# Patient Record
Sex: MALE | Race: WHITE | NOT HISPANIC OR LATINO | Employment: FULL TIME | URBAN - METROPOLITAN AREA
[De-identification: names, ages, dates, MRNs, and addresses within clinical notes are randomized per-mention and may not be internally consistent; named-entity substitution may affect disease eponyms.]

---

## 2017-02-13 ENCOUNTER — OFFICE VISIT (OUTPATIENT)
Dept: FAMILY MEDICINE | Facility: CLINIC | Age: 57
End: 2017-02-13
Payer: COMMERCIAL

## 2017-02-13 VITALS
TEMPERATURE: 97.4 F | DIASTOLIC BLOOD PRESSURE: 76 MMHG | HEIGHT: 70 IN | WEIGHT: 220 LBS | BODY MASS INDEX: 31.5 KG/M2 | HEART RATE: 64 BPM | SYSTOLIC BLOOD PRESSURE: 121 MMHG

## 2017-02-13 DIAGNOSIS — Z00.01 ENCOUNTER FOR ROUTINE ADULT MEDICAL EXAM WITH ABNORMAL FINDINGS: ICD-10-CM

## 2017-02-13 DIAGNOSIS — Z00.00 ROUTINE GENERAL MEDICAL EXAMINATION AT A HEALTH CARE FACILITY: ICD-10-CM

## 2017-02-13 DIAGNOSIS — J30.1 SEASONAL ALLERGIC RHINITIS DUE TO POLLEN: Primary | ICD-10-CM

## 2017-02-13 PROCEDURE — 99396 PREV VISIT EST AGE 40-64: CPT | Performed by: FAMILY MEDICINE

## 2017-02-13 RX ORDER — FLUTICASONE PROPIONATE 50 MCG
1-2 SPRAY, SUSPENSION (ML) NASAL DAILY
Qty: 1 BOTTLE | Refills: 11 | Status: SHIPPED | OUTPATIENT
Start: 2017-02-13

## 2017-02-13 NOTE — MR AVS SNAPSHOT
After Visit Summary   2/13/2017    Cristopher Cuellar    MRN: [id number]           Patient Information     Date Of Birth          1960        Visit Information        Provider Department      2/13/2017 6:00 PM Zafar Fragoso MD [facility]        Today's Diagnoses     Routine general medical examination at a health care facility        Encounter for routine adult medical exam with abnormal findings          Care Instructions    Heal issue is plantar fasciatus.  Stretch you calfs hamstrings, back.  If not improved call me for night splint.    Use Flonase daily for allergies.     Sit up straight.    Relaxation technique 1: Breathing meditation for stress relief  With its focus on full, cleansing breaths, deep breathing is a simple, yet powerful, relaxation technique. It s easy to learn, can be practiced almost anywhere, and provides a quick way to get your stress levels in check. Deep breathing is the cornerstone of many other relaxation practices, too, and can be combined with other relaxing elements such as aromatherapy and music. All you really need is a few minutes and a place to stretch out.  Practicing deep breathing meditation  The key to deep breathing is to breathe deeply from the abdomen, getting as much fresh air as possible in your lungs. When you take deep breaths from the abdomen, rather than shallow breaths from your upper chest, you inhale more oxygen. The more oxygen you get, the less tense, short of breath, and anxious you feel.  Sit comfortably with your back straight. Put one hand on your chest and the other on your stomach.   Breathe in through your nose. The hand on your stomach should rise. The hand on your chest should move very little.   Exhale through your mouth, pushing out as much air as you can while duke your abdominal muscles. The hand on your stomach should move in as you exhale, but your other hand should move very little.   Continue to breathe in  through your nose and out through your mouth. Try to inhale enough so that your lower abdomen rises and falls. Count slowly as you exhale.   If you find it difficult breathing from your abdomen while sitting up, try lying on the floor. Put a small book on your stomach, and try to breathe so that the book rises as you inhale and falls as you exhale.   Relaxation technique 2: Progressive muscle relaxation for stress relief  Progressive muscle relaxation involves a two-step process in which you systematically tense and relax different muscle groups in the body.  With regular practice, progressive muscle relaxation gives you an intimate familiarity with what tension--as well as complete relaxation--feels like in different parts of the body. This awareness helps you spot and counteract the first signs of the muscular tension that accompanies stress. And as your body relaxes, so will your mind. You can combine deep breathing with progressive muscle relaxation for an additional level of stress relief.  Practicing progressive muscle relaxation  Before practicing Progressive Muscle Relaxation, consult with your doctor if you have a history of muscle spasms, back problems, or other serious injuries that may be aggravated by tensing muscles.  Most progressive muscle relaxation practitioners start at the feet and work their way up to the face. For a sequence of muscle groups to follow, see the box below.  Loosen your clothing, take off your shoes, and get comfortable.   Take a few minutes to relax, breathing in and out in slow, deep breaths.   When you re relaxed and ready to start, shift your attention to your right foot. Take a moment to focus on the way it feels.   Slowly tense the muscles in your right foot, squeezing as tightly as you can. Hold for a count of 10.   Relax your right foot. Focus on the tension flowing away and the way your foot feels as it becomes limp and loose.   Stay in this relaxed state for a moment,  breathing deeply and slowly.   When you re ready, shift your attention to your left foot. Follow the same sequence of muscle tension and release.   Move slowly up through your body, duke and relaxing the muscle groups as you go.   It may take some practice at first, but try not to tense muscles other than those intended.   Progressive Muscle Relaxation Sequence  The most popular sequence runs as follows:  1. Right foot*   2. Left foot   3. Right calf   4. Left calf   5. Right thigh  6. Left thigh   7. Hips and buttocks   8. Stomach   9. Chest   10. Back  11. Right arm and hand   12. Left arm and hand   13. Neck and shoulders   14. Face    * If you are left-handed you may want to begin with your left foot instead.  Relaxation technique 3: Body scan meditation for stress relief  A body scan is similar to progressive muscle relaxation except, instead of tensing and relaxing muscles, you simply focus on the sensations in each part of your body.   Practicing body scan meditation  Lie on your back, legs uncrossed, arms relaxed at your sides, eyes open or closed. Focus on your breathing , allowing your stomach to rise as you inhale and fall as you exhale. Breathe deeply for about two minutes, until you start to feel comfortable and relaxed.   Turn your focus to the toes of your right foot. Notice any sensations you feel while continuing to also focus on your breathing. Imagine each deep breath flowing to your toes. Remain focused on this area for one to two minutes.   Move your focus to the sole of your right foot. Tune in to any sensations you feel in that part of your body and imagine each breath flowing from the sole of your foot. After one or two minutes, move your focus to your right ankle and repeat. Move to your calf, knee, thigh, hip, and then repeat the sequence for your left leg. From there, move up the torso, through the lower back and abdomen, the upper back and chest, and the shoulders. Pay close  attention to any area of the body that causes you pain or discomfort.   Move your focus to the fingers on your right hand and then move up to the wrist, forearm, elbow, upper arm, and shoulder. Repeat for your left arm. Then move through the neck and throat, and finally all the regions of your face, the back of the head, and the top of the head. Pay close attention to your jaw, chin, lips, tongue, nose, cheeks, eyes, forehead, temples and scalp. When you reach the very top of your head, let your breath reach out beyond your body and imagine yourself hovering above yourself.   After completing the body scan, relax for a while in silence and stillness, noting how your body feels. Then open your eyes slowly. Take a moment to stretch, if necessary.   For a guided body scan meditation, see the Resources section below.  Relaxation technique 4: Mindfulness for stress relief  Mindfulness is the ability to remain aware of how you re feeling right now, your  [id number]  experience--both internal and external. Thinking about the past--blaming and judging yourself--or worrying about the future can often lead to a degree of stress that is overwhelming. But by staying calm and focused in the present moment, you can bring your nervous system back into balance. Mindfulness can be applied to activities such as walking, exercising, eating, or meditation.  Meditations that cultivate mindfulness have long been used to reduce overwhelming stress. Some of these meditations bring you into the present by focusing your attention on a single repetitive action, such as your breathing, a few repeated words, or flickering light from a candle. Other forms of mindfulness meditation encourage you to follow and then release internal thoughts or sensations.  Practicing mindfulness meditation  Key points in mindfulness mediation are:  A quiet environment. Choose a secluded place in your home, office, garden, place of Yarsanism, or in the great  outdoors where you can relax without distractions or interruptions.   A comfortable position. Get comfortable, but avoid lying down as this may lead to you falling asleep. Sit up with your spine straight, either in a chair or on the floor. You can also try a cross-legged or adryan position.   A point of focus. This point can be internal - a feeling or imaginary scene - or something external - a flame or meaningful word or phrase that you repeat it throughout your session. You may meditate with eyes open or closed. Also choose to focus on an object in your surroundings to enhance your concentration, or alternately, you can close your eyes.   An observant, noncritical attitude. Don t worry about distracting thoughts that go through your mind or about how well you re doing. If thoughts intrude during your relaxation session, don t fight them. Instead, gently turn your attention back to your point of focus.   Relaxation technique 5: Visualization meditation for stress relief  Visualization, or guided imagery, is a variation on traditional meditation that requires you to employ not only your visual sense, but also your sense of taste, touch, smell, and sound. When used as a relaxation technique, visualization involves imagining a scene in which you feel at peace, free to let go of all tension and anxiety.  Choose whatever setting is most calming to you, whether it s a tropical beach, a favorite childhood spot, or a quiet wooded pranay. You can do this visualization exercise on your own in silence, while listening to soothing music, or with a therapist (or an audio recording of a therapist) guiding you through the imagery. To help you employ your sense of hearing you can use a sound machine or download sounds that match your chosen setting--the sound of ocean waves if you ve chosen a beach, for example.  Practicing visualization  Find a quiet, relaxed place. Beginners sometimes fall asleep during a visualization meditation,  so you might try sitting up or standing.  Close your eyes and let your worries drift away. Imagine your restful place. Picture it as vividly as you can--everything you can see, hear, smell, and feel. Visualization works best if you incorporate as many sensory details as possible, using at least three of your senses. When visualizing, choose imagery that appeals to you; don t select images because someone else suggests them, or because you think they should be appealing. Let your own images come up and work for you.  If you are thinking about a dock on a quiet lake, for example:   Walk slowly around the dock and notice the colors and textures around you.   Spend some time exploring each of your senses.   See the sun setting over the water.   Hear the birds singing.   Smell the pine trees.   Feel the cool water on your bare feet.   Taste the fresh, clean air.   Enjoy the feeling of deep relaxation that envelopes you as you slowly explore your restful place. When you are ready, gently open your eyes and come back to the present.  Don't worry if you sometimes zone out or lose track of where you are during a guided imagery session. This is normal. You may also experience feelings of stiffness or heaviness in your limbs, minor, involuntary muscle-movements, or even cough or yawn. Again, these are normal responses.   Relaxation technique 6: Yoga and sean chi for stress relief  Yoga involves a series of both moving and stationary poses, combined with deep breathing. As well as reducing anxiety and stress, yoga can also improve flexibility, strength, balance, and stamina. Practiced regularly, it can also strengthen the relaxation response in your daily life. Since injuries can happen when yoga is practiced incorrectly, it s best to learn by attending group classes, hiring a private teacher, or at least following video instructions.  What type of yoga is best for stress?  Although almost all yoga classes end in a relaxation  pose, classes that emphasize slow, steady movement, deep breathing, and gentle stretching are best for stress relief.  Satyananda is a traditional form of yoga. It features gentle poses, deep relaxation, and meditation, making it suitable for beginners as well as anyone primarily looking for stress reduction.   Hatha yoga is also reasonably gentle way to relieve stress and is suitable for beginners. Alternately, look for labels like gentle, for stress relief, or for beginners when selecting a yoga class.   Power yoga, with its intense poses and focus on fitness, is better suited to those looking for stimulation as well as relaxation.   If you re unsure whether a specific yoga class is appropriate for stress relief, call the studio or ask the teacher.  Dorian chi  If you ve ever seen a group of people in the park slowly moving in synch, you ve probably witnessed dorian chi. Dorian chi is a self-paced, non-competitive series of slow, flowing body movements. These movements emphasize concentration, relaxation, and the conscious circulation of vital energy throughout the body. Though dorian chi has its roots in martial arts, today it is primarily practiced as a way of calming the mind, conditioning the body, and reducing stress. As in meditation, dorian chi practitioners focus on their breathing and keeping their attention in the present moment.  Dorian chi is a safe, low-impact option for people of all ages and levels of fitness, including older adults and those recovering from injuries. Like yoga, once you ve learned the basics of dorian chi or qi gong, you can practice alone or with others, tailoring your sessions as you see fit.   Making relaxation techniques a part of your life  The best way to start and maintain a relaxation practice is to incorporate it into your daily routine. Between work, family, school, and other commitments, though, it can be tough for many people to find the time. Fortunately, many of the techniques can be  practiced while you re doing other things.  Rhythmic exercise as a mindfulness relaxation technique  Rhythmic exercise--such as running, walking, rowing, or cycling--is most effective at relieving stress when performed with relaxation in mind. As with meditation, mindfulness requires being fully engaged in the present moment, focusing your mind on how your body feels right now. As you exercise, focus on the physicality of your body s movement and how your breathing complements that movement. If your mind wanders to other thoughts, gently return to focusing on your breathing and movement.  If walking or running, for example, focus on each step--the sensation of your feet touching the ground, the rhythm of your breath while moving, and the feeling of the wind against your face.  Tips for fitting relaxation techniques into your life  If possible, schedule a set time to practice each day. Set aside one or two periods each day. You may find that it s easier to stick with your practice if you do it first thing in the morning, before other tasks and responsibilities get in the way.   Practice relaxation techniques while you re doing other things. Meditate while commuting to work on a bus or train, or waiting for a dentist appointment. Try deep breathing while you re doing housework or mowing the lawn. Mindfulness walking can be done while exercising your dog, walking to your car, or climbing the stairs at work instead of using the elevator. Once you ve learned techniques such as sean chi, you can practice them in your office or in the park at lunchtime.   If you exercise, improve the relaxation benefits by adopting mindfulness. Instead of zoning out or staring at a TV as you exercise, try focusing your attention on your body. If you re resistance training, for example, focus on coordinating your breathing with your movements and pay attention to how your body feels as you raise and lower the weights.   Avoid practicing when  you re sleepy. These techniques can relax you so much that they can make you very sleepy, especially if it s close to bedtime. You will get the most benefit if you practice when you re fully awake and alert. Do not practice after eating a heavy meal or while using drugs, tobacco, or alcohol.   Expect ups and downs. Don t be discouraged if you skip a few days or even a few weeks. It happens. Just get started again and slowly build up to your old momentum.                    Please call and schedule your FASTING Cholesterol blood test that is due.  Fasting = Nothing to eat or drink for 10-12 hours prior to test, but can have plenty of water.    These are the locations available for blood collection:  [facility]   Ph: [phone]                                   [facility]   Ph: [phone]               -Saturday lab appointments available.     [facility]    Ph: [phone]               Preventive Health Recommendations  Male Ages 50 - 64    Yearly exam:             See your health care provider every year in order to  o   Review health changes.   o   Discuss preventive care.    o   Review your medicines if your doctor has prescribed any.     Have a cholesterol test every 5 years, or more frequently if you are at risk for high cholesterol/heart disease.     Have a diabetes test (fasting glucose) every three years. If you are at risk for diabetes, you should have this test more often.     Have a colonoscopy at age 50, or have a yearly FIT test (stool test). These exams will check for colon cancer.      Talk with your health care provider about whether or not a prostate cancer screening test (PSA) is right for you.    You should be tested each year for STDs (sexually transmitted diseases), if you re at risk.     Shots: Get a flu shot each year. Get a tetanus shot every 10 years.     Nutrition:    Eat at least 5 servings of fruits and vegetables daily.     Eat whole-grain bread, whole-wheat pasta  "and brown rice instead of white grains and rice.     Talk to your provider about Calcium and Vitamin D.     Lifestyle    Exercise for at least 150 minutes a week (30 minutes a day, 5 days a week). This will help you control your weight and prevent disease.     Limit alcohol to one drink per day.     No smoking.     Wear sunscreen to prevent skin cancer.     See your dentist every six months for an exam and cleaning.     See your eye doctor every 1 to 2 years.          Follow-ups after your visit        Who to contact     Normal or non-critical lab and imaging results will be communicated to you by [email or web address], letter or phone within 4 business days after the clinic has received the results. If you do not hear from us within 7 days, please contact the clinic through [facility] or phone. If you have a critical or abnormal lab result, we will notify you by phone as soon as possible.  Submit refill requests through [facility] or call your pharmacy and they will forward the refill request to us. Please allow 3 business days for your refill to be completed.          If you need to speak with a  for additional information , please call: [phone]             Additional Information About Your Visit        [facility] Information     [facility] lets you send messages to your doctor, view your test results, renew your prescriptions, schedule appointments and more. To sign up, go to [email or web address] . Click on \"Log in\" on the left side of the screen, which will take you to the Welcome page. Then click on \"Sign up Now\" on the right side of the page.     You will be asked to enter the access code listed below, as well as some personal information. Please follow the directions to create your username and password.     Your access code is: [email or web address]  Expires: 2017  7:23 PM     Your access code will  in 90 days. If you need help or a new code, please call your [facility] or [phone].        Care " "EveryWhere ID     This is your Care EveryWhere ID. This could be used by other organizations to access your [facility] medical records  [id number]        Your Vitals Were     Pulse Temperature Height BMI (Body Mass Index)          64 97.4  F (36.3  C) (Tympanic) 5' 9.75\" (1.772 m) 31.79 kg/m2         Blood Pressure from Last 3 Encounters:   02/13/17 121/76   06/02/16 110/74   05/04/15 110/76    Weight from Last 3 Encounters:   02/13/17 220 lb (99.8 kg)   06/02/16 222 lb 9.6 oz (101 kg)   05/04/15 220 lb 12.8 oz (100.2 kg)              Today, you had the following     No orders found for display       Primary Care Provider Office Phone # Fax #    Zafar Fragoso [phone]       [facility] [address] [facility] [address]        Thank you!     Thank you for choosing [facility]  for your care. Our goal is always to provide you with excellent care. Hearing back from our patients is one way we can continue to improve our services. Please take a few minutes to complete the written survey that you may receive in the mail after your visit with us. Thank you!             Your Updated Medication List - Protect others around you: Learn how to safely use, store and throw away your medicines at www.disposemymeds.org.          This list is accurate as of: 2/13/17  7:27 PM.  Always use your most recent med list.                   Brand Name Dispense Instructions for use    cetirizine 10 MG tablet    zyrTEC     Take 10 mg by mouth daily as needed.       cholecalciferol 1000 UNIT tablet    vitamin D     Take 2,000 mg by mouth daily.       ibuprofen 200 MG tablet    ADVIL/MOTRIN     Take 200 mg by mouth every 4 hours as needed for mild pain       VITAMIN C PO      Take 1 tablet by mouth daily         "

## 2017-02-14 ASSESSMENT — PATIENT HEALTH QUESTIONNAIRE - PHQ9: 5. POOR APPETITE OR OVEREATING: SEVERAL DAYS

## 2017-02-14 ASSESSMENT — ANXIETY QUESTIONNAIRES
5. BEING SO RESTLESS THAT IT IS HARD TO SIT STILL: NOT AT ALL
2. NOT BEING ABLE TO STOP OR CONTROL WORRYING: SEVERAL DAYS
GAD7 TOTAL SCORE: 6
7. FEELING AFRAID AS IF SOMETHING AWFUL MIGHT HAPPEN: NOT AT ALL
1. FEELING NERVOUS, ANXIOUS, OR ON EDGE: MORE THAN HALF THE DAYS
6. BECOMING EASILY ANNOYED OR IRRITABLE: SEVERAL DAYS
3. WORRYING TOO MUCH ABOUT DIFFERENT THINGS: SEVERAL DAYS

## 2017-02-14 NOTE — PATIENT INSTRUCTIONS
Heal issue is plantar fasciatus.  Stretch you calfs hamstrings, back.  If not improved call me for night splint.    Use Flonase daily for allergies.     Sit up straight.    Relaxation technique 1: Breathing meditation for stress relief  With its focus on full, cleansing breaths, deep breathing is a simple, yet powerful, relaxation technique. It s easy to learn, can be practiced almost anywhere, and provides a quick way to get your stress levels in check. Deep breathing is the cornerstone of many other relaxation practices, too, and can be combined with other relaxing elements such as aromatherapy and music. All you really need is a few minutes and a place to stretch out.  Practicing deep breathing meditation  The key to deep breathing is to breathe deeply from the abdomen, getting as much fresh air as possible in your lungs. When you take deep breaths from the abdomen, rather than shallow breaths from your upper chest, you inhale more oxygen. The more oxygen you get, the less tense, short of breath, and anxious you feel.  Sit comfortably with your back straight. Put one hand on your chest and the other on your stomach.   Breathe in through your nose. The hand on your stomach should rise. The hand on your chest should move very little.   Exhale through your mouth, pushing out as much air as you can while duke your abdominal muscles. The hand on your stomach should move in as you exhale, but your other hand should move very little.   Continue to breathe in through your nose and out through your mouth. Try to inhale enough so that your lower abdomen rises and falls. Count slowly as you exhale.   If you find it difficult breathing from your abdomen while sitting up, try lying on the floor. Put a small book on your stomach, and try to breathe so that the book rises as you inhale and falls as you exhale.   Relaxation technique 2: Progressive muscle relaxation for stress relief  Progressive muscle relaxation  involves a two-step process in which you systematically tense and relax different muscle groups in the body.  With regular practice, progressive muscle relaxation gives you an intimate familiarity with what tension as well as complete relaxation feels like in different parts of the body. This awareness helps you spot and counteract the first signs of the muscular tension that accompanies stress. And as your body relaxes, so will your mind. You can combine deep breathing with progressive muscle relaxation for an additional level of stress relief.  Practicing progressive muscle relaxation  Before practicing Progressive Muscle Relaxation, consult with your doctor if you have a history of muscle spasms, back problems, or other serious injuries that may be aggravated by tensing muscles.  Most progressive muscle relaxation practitioners start at the feet and work their way up to the face. For a sequence of muscle groups to follow, see the box below.  Loosen your clothing, take off your shoes, and get comfortable.   Take a few minutes to relax, breathing in and out in slow, deep breaths.   When you re relaxed and ready to start, shift your attention to your right foot. Take a moment to focus on the way it feels.   Slowly tense the muscles in your right foot, squeezing as tightly as you can. Hold for a count of 10.   Relax your right foot. Focus on the tension flowing away and the way your foot feels as it becomes limp and loose.   Stay in this relaxed state for a moment, breathing deeply and slowly.   When you re ready, shift your attention to your left foot. Follow the same sequence of muscle tension and release.   Move slowly up through your body, duke and relaxing the muscle groups as you go.   It may take some practice at first, but try not to tense muscles other than those intended.   Progressive Muscle Relaxation Sequence  The most popular sequence runs as follows:  1. Right foot*   2. Left foot   3. Right  calf   4. Left calf   5. Right thigh  6. Left thigh   7. Hips and buttocks   8. Stomach   9. Chest   10. Back  11. Right arm and hand   12. Left arm and hand   13. Neck and shoulders   14. Face    * If you are left-handed you may want to begin with your left foot instead.  Relaxation technique 3: Body scan meditation for stress relief  A body scan is similar to progressive muscle relaxation except, instead of tensing and relaxing muscles, you simply focus on the sensations in each part of your body.   Practicing body scan meditation  Lie on your back, legs uncrossed, arms relaxed at your sides, eyes open or closed. Focus on your breathing , allowing your stomach to rise as you inhale and fall as you exhale. Breathe deeply for about two minutes, until you start to feel comfortable and relaxed.   Turn your focus to the toes of your right foot. Notice any sensations you feel while continuing to also focus on your breathing. Imagine each deep breath flowing to your toes. Remain focused on this area for one to two minutes.   Move your focus to the sole of your right foot. Tune in to any sensations you feel in that part of your body and imagine each breath flowing from the sole of your foot. After one or two minutes, move your focus to your right ankle and repeat. Move to your calf, knee, thigh, hip, and then repeat the sequence for your left leg. From there, move up the torso, through the lower back and abdomen, the upper back and chest, and the shoulders. Pay close attention to any area of the body that causes you pain or discomfort.   Move your focus to the fingers on your right hand and then move up to the wrist, forearm, elbow, upper arm, and shoulder. Repeat for your left arm. Then move through the neck and throat, and finally all the regions of your face, the back of the head, and the top of the head. Pay close attention to your jaw, chin, lips, tongue, nose, cheeks, eyes, forehead, temples and scalp. When you  reach the very top of your head, let your breath reach out beyond your body and imagine yourself hovering above yourself.   After completing the body scan, relax for a while in silence and stillness, noting how your body feels. Then open your eyes slowly. Take a moment to stretch, if necessary.   For a guided body scan meditation, see the Resources section below.  Relaxation technique 4: Mindfulness for stress relief  Mindfulness is the ability to remain aware of how you re feeling right now, your  [id number]  experience both internal and external. Thinking about the past blaming and judging yourself or worrying about the future can often lead to a degree of stress that is overwhelming. But by staying calm and focused in the present moment, you can bring your nervous system back into balance. Mindfulness can be applied to activities such as walking, exercising, eating, or meditation.  Meditations that cultivate mindfulness have long been used to reduce overwhelming stress. Some of these meditations bring you into the present by focusing your attention on a single repetitive action, such as your breathing, a few repeated words, or flickering light from a candle. Other forms of mindfulness meditation encourage you to follow and then release internal thoughts or sensations.  Practicing mindfulness meditation  Key points in mindfulness mediation are:  A quiet environment. Choose a secluded place in your home, office, garden, place of [place], or in the great outdoors where you can relax without distractions or interruptions.   A comfortable position. Get comfortable, but avoid lying down as this may lead to you falling asleep. Sit up with your spine straight, either in a chair or on the floor. You can also try a cross-legged or adryan position.   A point of focus. This point can be internal   a feeling or imaginary scene   or something external - a flame or meaningful word or phrase that you repeat it throughout  your session. You may meditate with eyes open or closed. Also choose to focus on an object in your surroundings to enhance your concentration, or alternately, you can close your eyes.   An observant, noncritical attitude. Don t worry about distracting thoughts that go through your mind or about how well you re doing. If thoughts intrude during your relaxation session, don t fight them. Instead, gently turn your attention back to your point of focus.   Relaxation technique 5: Visualization meditation for stress relief  Visualization, or guided imagery, is a variation on traditional meditation that requires you to employ not only your visual sense, but also your sense of taste, touch, smell, and sound. When used as a relaxation technique, visualization involves imagining a scene in which you feel at peace, free to let go of all tension and anxiety.  Choose whatever setting is most calming to you, whether it s a tropical beach, a favorite childhood spot, or a quiet wooded pranay. You can do this visualization exercise on your own in silence, while listening to soothing music, or with a therapist (or an audio recording of a therapist) guiding you through the imagery. To help you employ your sense of hearing you can use a sound machine or download sounds that match your chosen setting the sound of ocean waves if you ve chosen a beach, for example.  Practicing visualization  Find a quiet, relaxed place. Beginners sometimes fall asleep during a visualization meditation, so you might try sitting up or standing.  Close your eyes and let your worries drift away. Imagine your restful place. Picture it as vividly as you can everything you can see, hear, smell, and feel. Visualization works best if you incorporate as many sensory details as possible, using at least three of your senses. When visualizing, choose imagery that appeals to you; don t select images because someone else suggests them, or because you think they should be  appealing. Let your own images come up and work for you.  If you are thinking about a dock on a quiet lake, for example:   Walk slowly around the dock and notice the colors and textures around you.   Spend some time exploring each of your senses.   See the sun setting over the water.   Hear the birds singing.   Smell the pine trees.   Feel the cool water on your bare feet.   Taste the fresh, clean air.   Enjoy the feeling of deep relaxation that envelopes you as you slowly explore your restful place. When you are ready, gently open your eyes and come back to the present.  Don't worry if you sometimes zone out or lose track of where you are during a guided imagery session. This is normal. You may also experience feelings of stiffness or heaviness in your limbs, minor, involuntary muscle-movements, or even cough or yawn. Again, these are normal responses.   Relaxation technique 6: Yoga and sean chi for stress relief  Yoga involves a series of both moving and stationary poses, combined with deep breathing. As well as reducing anxiety and stress, yoga can also improve flexibility, strength, balance, and stamina. Practiced regularly, it can also strengthen the relaxation response in your daily life. Since injuries can happen when yoga is practiced incorrectly, it s best to learn by attending group classes, hiring a private teacher, or at least following video instructions.  What type of yoga is best for stress?  Although almost all yoga classes end in a relaxation pose, classes that emphasize slow, steady movement, deep breathing, and gentle stretching are best for stress relief.  Satyananda is a traditional form of yoga. It features gentle poses, deep relaxation, and meditation, making it suitable for beginners as well as anyone primarily looking for stress reduction.   Hatha yoga is also reasonably gentle way to relieve stress and is suitable for beginners. Alternately, look for labels like gentle, for stress relief, or  for beginners when selecting a yoga class.   Power yoga, with its intense poses and focus on fitness, is better suited to those looking for stimulation as well as relaxation.   If you re unsure whether a specific yoga class is appropriate for stress relief, call the studio or ask the teacher.  Dorian chi  If you ve ever seen a group of people in the park slowly moving in synch, you ve probably witnessed dorian chi. Dorian chi is a self-paced, non-competitive series of slow, flowing body movements. These movements emphasize concentration, relaxation, and the conscious circulation of vital energy throughout the body. Though dorian chi has its roots in martial arts, today it is primarily practiced as a way of calming the mind, conditioning the body, and reducing stress. As in meditation, dorian chi practitioners focus on their breathing and keeping their attention in the present moment.  Dorian chi is a safe, low-impact option for people of all ages and levels of fitness, including older adults and those recovering from injuries. Like yoga, once you ve learned the basics of dorian chi or qi gong, you can practice alone or with others, tailoring your sessions as you see fit.   Making relaxation techniques a part of your life  The best way to start and maintain a relaxation practice is to incorporate it into your daily routine. Between work, family, school, and other commitments, though, it can be tough for many people to find the time. Fortunately, many of the techniques can be practiced while you re doing other things.  Rhythmic exercise as a mindfulness relaxation technique  Rhythmic exercise such as running, walking, rowing, or cycling is most effective at relieving stress when performed with relaxation in mind. As with meditation, mindfulness requires being fully engaged in the present moment, focusing your mind on how your body feels right now. As you exercise, focus on the physicality of your body s movement and how your breathing  complements that movement. If your mind wanders to other thoughts, gently return to focusing on your breathing and movement.  If walking or running, for example, focus on each step the sensation of your feet touching the ground, the rhythm of your breath while moving, and the feeling of the wind against your face.  Tips for fitting relaxation techniques into your life  If possible, schedule a set time to practice each day. Set aside one or two periods each day. You may find that it s easier to stick with your practice if you do it first thing in the morning, before other tasks and responsibilities get in the way.   Practice relaxation techniques while you re doing other things. Meditate while commuting to work on a bus or train, or waiting for a dentist appointment. Try deep breathing while you re doing housework or mowing the lawn. Mindfulness walking can be done while exercising your dog, walking to your car, or climbing the stairs at work instead of using the elevator. Once you ve learned techniques such as sean chi, you can practice them in your office or in the park at lunchtime.   If you exercise, improve the relaxation benefits by adopting mindfulness. Instead of zoning out or staring at a TV as you exercise, try focusing your attention on your body. If you re resistance training, for example, focus on coordinating your breathing with your movements and pay attention to how your body feels as you raise and lower the weights.   Avoid practicing when you re sleepy. These techniques can relax you so much that they can make you very sleepy, especially if it s close to bedtime. You will get the most benefit if you practice when you re fully awake and alert. Do not practice after eating a heavy meal or while using drugs, tobacco, or alcohol.   Expect ups and downs. Don t be discouraged if you skip a few days or even a few weeks. It happens. Just get started again and slowly build up to your old momentum.                     Please call and schedule your FASTING Cholesterol blood test that is due.  Fasting = Nothing to eat or drink for 10-12 hours prior to test, but can have plenty of water.    These are the locations available for blood collection:  [facility]   Ph: [phone]                                   [facility]   Ph: [phone]               -Saturday lab appointments available.     [facility]    Ph: [phone]               Preventive Health Recommendations  Male Ages 50   64    Yearly exam:             See your health care provider every year in order to  o   Review health changes.   o   Discuss preventive care.    o   Review your medicines if your doctor has prescribed any.     Have a cholesterol test every 5 years, or more frequently if you are at risk for high cholesterol/heart disease.     Have a diabetes test (fasting glucose) every three years. If you are at risk for diabetes, you should have this test more often.     Have a colonoscopy at age 50, or have a yearly FIT test (stool test). These exams will check for colon cancer.      Talk with your health care provider about whether or not a prostate cancer screening test (PSA) is right for you.    You should be tested each year for STDs (sexually transmitted diseases), if you re at risk.     Shots: Get a flu shot each year. Get a tetanus shot every 10 years.     Nutrition:    Eat at least 5 servings of fruits and vegetables daily.     Eat whole-grain bread, whole-wheat pasta and brown rice instead of white grains and rice.     Talk to your provider about Calcium and Vitamin D.     Lifestyle    Exercise for at least 150 minutes a week (30 minutes a day, 5 days a week). This will help you control your weight and prevent disease.     Limit alcohol to one drink per day.     No smoking.     Wear sunscreen to prevent skin cancer.     See your dentist every six months for an exam and cleaning.     See your eye doctor every 1 to 2 years.

## 2017-02-14 NOTE — PROGRESS NOTES
SUBJECTIVE:     CC: Cristopher Cuellar is an 56 year old male who presents for preventative health visit.     Healthy Habits:    Do you get at least three servings of calcium containing foods daily (dairy, green leafy vegetables, etc.)? no, taking calcium and/or vitamin D supplement: yes     Amount of exercise or daily activities, outside of work: 0 day(s) per week    Problems taking medications regularly not applicable    Medication side effects: No    Have you had an eye exam in the past two years? yes    Do you see a dentist twice per year? yes    Do you have sleep apnea, excessive snoring or daytime drowsiness?no    Chief Complaint   Patient presents with     Physical     Foot Problems     Right heel pain for 3 months, does stretches, more painful with certain exercise.      Anxiety     discuss non medication to help     Allergies     Zyrtec is no longer helping. Last tested in 1992 in [place]. Discuss any other OTC medication. Has also tried Allegra and Claritin         Today's PHQ-2 Score:   PHQ-2 ( 1999 [employer]) 6/2/2016 5/4/2015   Q1: Little interest or pleasure in doing things 0 0   Q2: Feeling down, depressed or hopeless 0 0   PHQ-2 Score 0 0       Abuse: Current or Past(Physical, Sexual or Emotional)- No  Do you feel safe in your environment - Yes    Social History   Substance Use Topics     Smoking status: Former Smoker     Types: Cigars     Quit date: 11/1/2014     Smokeless tobacco: Never Used      Comment: 1 cigar per day during the summer     Alcohol use No     The patient does not drink >3 drinks per day nor >7 drinks per week.    Last PSA:   PSA   Date Value Ref Range Status   01/11/2012 0.84 ng/mL        Recent Labs   Lab Test 01/11/12   CHOL  201   HDL  49   LDL  126   TRIG  126       Reviewed orders with patient. Reviewed health maintenance and updated orders accordingly - Yes    All Histories reviewed and updated in Epic.      ROS:  C: NEGATIVE for fever, chills, change in weight  I: NEGATIVE for  "worrisome rashes, moles or lesions  E: NEGATIVE for vision changes or irritation  ENT: NEGATIVE for ear, mouth and throat problems  R: NEGATIVE for significant cough or SOB  CV: NEGATIVE for chest pain, palpitations or peripheral edema  GI: NEGATIVE for nausea, abdominal pain, heartburn, or change in bowel habits   male: negative for dysuria, hematuria, decreased urinary stream, erectile dysfunction, urethral discharge  M: NEGATIVE for significant arthralgias or myalgia  N: NEGATIVE for weakness, dizziness or paresthesias  P: NEGATIVE for changes in mood or affect    Problem list, Medication list, Allergies, and Medical/Social/Surgical histories reviewed in EPIC and updated as appropriate.  OBJECTIVE:     /76 (BP Location: Right arm, Patient Position: Right side)  Pulse 64  Temp 97.4  F (36.3  C) (Tympanic)  Ht 5' 9.75\" (1.772 m)  Wt 220 lb (99.8 kg)  BMI 31.79 kg/m2  EXAM:  GENERAL: healthy, alert and no distress  NECK: no adenopathy, no asymmetry, masses, or scars and thyroid normal to palpation  RESP: lungs clear to auscultation - no rales, rhonchi or wheezes  CV: regular rate and rhythm, normal S1 S2, no S3 or S4, no murmur, click or rub, no peripheral edema and peripheral pulses strong  ABDOMEN: soft, nontender, no hepatosplenomegaly, no masses and bowel sounds normal  MS: no gross musculoskeletal defects noted, no edema    ASSESSMENT/PLAN:     1. Routine general medical examination at a health care facility      2. Encounter for routine adult medical exam with abnormal findings      3. Seasonal allergic rhinitis due to pollen    - fluticasone (FLONASE) 50 MCG/ACT spray; Spray 1-2 sprays into both nostrils daily  Dispense: 1 Bottle; Refill: 11    Plantar fasciatists.    Discussed treatment.  He will weal heal cups at work but not other times.   .[id number] may need night splints.   COUNSELING:  Reviewed preventive health counseling, as reflected in patient instructions       Regular exercise       Healthy " "diet/nutrition       Vision screening       Hearing screening       Colon cancer screening     reports that he quit smoking about 2 years ago. His smoking use included Cigars. He has never used smokeless tobacco.    Estimated body mass index is 31.79 kg/(m^2) as calculated from the following:    Height as of this encounter: 5' 9.75\" (1.772 m).    Weight as of this encounter: 220 lb (99.8 kg).   Weight management plan: Discussed healthy diet and exercise guidelines and patient will follow up in 12 months in clinic to re-evaluate.    Counseling Resources:  ATP IV Guidelines  Pooled Cohorts Equation Calculator  FRAX Risk Assessment  ICSI Preventive Guidelines  Dietary Guidelines for Americans, 2010  USDA's MyPlate  ASA Prophylaxis  Lung CA Screening    Zafar Fragoso MD  [facility]  "

## 2017-02-14 NOTE — NURSING NOTE
"Chief Complaint   Patient presents with     Physical     Foot Problems     Right heel pain for 3 months, does stretches, more painful with certain exercise.      Anxiety     discuss non medication to help     Allergies     Zyrtec is no longer helping. Last tested in 1992 in [place]. Discuss any other OTC medication. Has also tried Allegra and Claritin         Initial /76 (BP Location: Right arm, Patient Position: Right side)  Pulse 64  Temp 97.4  F (36.3  C) (Tympanic)  Ht 5' 9.75\" (1.772 m)  Wt 220 lb (99.8 kg)  BMI 31.79 kg/m2 Estimated body mass index is 31.79 kg/(m^2) as calculated from the following:    Height as of this encounter: 5' 9.75\" (1.772 m).    Weight as of this encounter: 220 lb (99.8 kg).  Medication Reconciliation: complete  "

## 2017-02-15 ASSESSMENT — ANXIETY QUESTIONNAIRES: GAD7 TOTAL SCORE: 6

## 2017-02-15 ASSESSMENT — PATIENT HEALTH QUESTIONNAIRE - PHQ9: SUM OF ALL RESPONSES TO PHQ QUESTIONS 1-9: 5

## 2018-12-06 ENCOUNTER — OFFICE VISIT (OUTPATIENT)
Dept: FAMILY MEDICINE | Facility: CLINIC | Age: 58
End: 2018-12-06
Payer: COMMERCIAL

## 2018-12-06 VITALS
BODY MASS INDEX: 31.44 KG/M2 | HEIGHT: 70 IN | TEMPERATURE: 97.2 F | OXYGEN SATURATION: 99 % | SYSTOLIC BLOOD PRESSURE: 113 MMHG | HEART RATE: 60 BPM | WEIGHT: 219.6 LBS | DIASTOLIC BLOOD PRESSURE: 74 MMHG

## 2018-12-06 DIAGNOSIS — Z13.6 CARDIOVASCULAR SCREENING; LDL GOAL LESS THAN 160: ICD-10-CM

## 2018-12-06 DIAGNOSIS — Z11.59 NEED FOR HEPATITIS C SCREENING TEST: ICD-10-CM

## 2018-12-06 DIAGNOSIS — M65.331 TRIGGER MIDDLE FINGER OF RIGHT HAND: ICD-10-CM

## 2018-12-06 DIAGNOSIS — Z00.01 ENCOUNTER FOR ROUTINE ADULT MEDICAL EXAM WITH ABNORMAL FINDINGS: ICD-10-CM

## 2018-12-06 DIAGNOSIS — R06.02 SOB (SHORTNESS OF BREATH): Primary | ICD-10-CM

## 2018-12-06 DIAGNOSIS — Z00.00 ROUTINE GENERAL MEDICAL EXAMINATION AT A HEALTH CARE FACILITY: ICD-10-CM

## 2018-12-06 DIAGNOSIS — Z11.4 SCREENING FOR HIV (HUMAN IMMUNODEFICIENCY VIRUS): ICD-10-CM

## 2018-12-06 LAB
BASOPHILS # BLD AUTO: 0.1 10E9/L (ref 0–0.2)
BASOPHILS NFR BLD AUTO: 0.8 %
CHOLEST SERPL-MCNC: 217 MG/DL
DIFFERENTIAL METHOD BLD: NORMAL
EOSINOPHIL # BLD AUTO: 0.4 10E9/L (ref 0–0.7)
EOSINOPHIL NFR BLD AUTO: 5.6 %
ERYTHROCYTE [DISTWIDTH] IN BLOOD BY AUTOMATED COUNT: 11.5 % (ref 10–15)
GLUCOSE SERPL-MCNC: 84 MG/DL (ref 70–99)
HCT VFR BLD AUTO: 48 % (ref 40–53)
HDLC SERPL-MCNC: 48 MG/DL
HGB BLD-MCNC: 16.6 G/DL (ref 13.3–17.7)
LDLC SERPL CALC-MCNC: 129 MG/DL
LYMPHOCYTES # BLD AUTO: 1.8 10E9/L (ref 0.8–5.3)
LYMPHOCYTES NFR BLD AUTO: 29.4 %
MCH RBC QN AUTO: 31.2 PG (ref 26.5–33)
MCHC RBC AUTO-ENTMCNC: 34.6 G/DL (ref 31.5–36.5)
MCV RBC AUTO: 90 FL (ref 78–100)
MONOCYTES # BLD AUTO: 0.8 10E9/L (ref 0–1.3)
MONOCYTES NFR BLD AUTO: 13 %
NEUTROPHILS # BLD AUTO: 3.2 10E9/L (ref 1.6–8.3)
NEUTROPHILS NFR BLD AUTO: 51.2 %
NONHDLC SERPL-MCNC: 169 MG/DL
PLATELET # BLD AUTO: 219 10E9/L (ref 150–450)
RBC # BLD AUTO: 5.32 10E12/L (ref 4.4–5.9)
TRIGL SERPL-MCNC: 199 MG/DL
TSH SERPL DL<=0.005 MIU/L-ACNC: 1.13 MU/L (ref 0.4–4)
WBC # BLD AUTO: 6.2 10E9/L (ref 4–11)

## 2018-12-06 PROCEDURE — 99213 OFFICE O/P EST LOW 20 MIN: CPT | Mod: 25 | Performed by: FAMILY MEDICINE

## 2018-12-06 PROCEDURE — 94010 BREATHING CAPACITY TEST: CPT | Performed by: FAMILY MEDICINE

## 2018-12-06 PROCEDURE — 87389 HIV-1 AG W/HIV-1&-2 AB AG IA: CPT | Performed by: FAMILY MEDICINE

## 2018-12-06 PROCEDURE — 84443 ASSAY THYROID STIM HORMONE: CPT | Performed by: FAMILY MEDICINE

## 2018-12-06 PROCEDURE — 87522 HEPATITIS C REVRS TRNSCRPJ: CPT | Performed by: FAMILY MEDICINE

## 2018-12-06 PROCEDURE — 36415 COLL VENOUS BLD VENIPUNCTURE: CPT | Performed by: FAMILY MEDICINE

## 2018-12-06 PROCEDURE — 86803 HEPATITIS C AB TEST: CPT | Performed by: FAMILY MEDICINE

## 2018-12-06 PROCEDURE — 99396 PREV VISIT EST AGE 40-64: CPT | Mod: 25 | Performed by: FAMILY MEDICINE

## 2018-12-06 PROCEDURE — 92551 PURE TONE HEARING TEST AIR: CPT | Performed by: FAMILY MEDICINE

## 2018-12-06 PROCEDURE — 82947 ASSAY GLUCOSE BLOOD QUANT: CPT | Performed by: FAMILY MEDICINE

## 2018-12-06 PROCEDURE — 80061 LIPID PANEL: CPT | Performed by: FAMILY MEDICINE

## 2018-12-06 PROCEDURE — 85025 COMPLETE CBC W/AUTO DIFF WBC: CPT | Performed by: FAMILY MEDICINE

## 2018-12-06 ASSESSMENT — PAIN SCALES - GENERAL: PAINLEVEL: NO PAIN (0)

## 2018-12-06 NOTE — MR AVS SNAPSHOT
After Visit Summary   12/6/2018    Cristopher Cuellar    MRN: [id number]           Patient Information     Date Of Birth          1960        Visit Information        Provider Department      12/6/2018 8:00 AM Zafar Fragoso MD [facility]        Today's Diagnoses     SOB (shortness of breath)    -  1    Routine general medical examination at a health care facility        Encounter for routine adult medical exam with abnormal findings        Need for hepatitis C screening test        Screening for HIV (human immunodeficiency virus)        CARDIOVASCULAR SCREENING; LDL GOAL LESS THAN 160        Trigger middle finger of right hand          Care Instructions    Keep working with chiro on neck  For wrists  Wear wrist splints cock up at nightt.  See Dr. Anguiano [facility] ortho for trigger fingers [facility] [place] [phone] and carple tunnel and wrist pain.        Get pad for keyboard.   Preventive Health Recommendations  Male Ages 50 - 64    Yearly exam:             See your health care provider every year in order to  o   Review health changes.   o   Discuss preventive care.    o   Review your medicines if your doctor has prescribed any.     Have a cholesterol test every 5 years, or more frequently if you are at risk for high cholesterol/heart disease.     Have a diabetes test (fasting glucose) every three years. If you are at risk for diabetes, you should have this test more often.     Have a colonoscopy at age 50, or have a yearly FIT test (stool test). These exams will check for colon cancer.      Talk with your health care provider about whether or not a prostate cancer screening test (PSA) is right for you.    You should be tested each year for STDs (sexually transmitted diseases), if you re at risk.     Shots: Get a flu shot each year. Get a tetanus shot every 10 years.     Nutrition:    Eat at least 5 servings of fruits and vegetables daily.     Eat whole-grain  bread, whole-wheat pasta and brown rice instead of white grains and rice.     Get adequate Calcium and Vitamin D.     Lifestyle    Exercise for at least 150 minutes a week (30 minutes a day, 5 days a week). This will help you control your weight and prevent disease.     Limit alcohol to one drink per day.     No smoking.     Wear sunscreen to prevent skin cancer.     See your dentist every six months for an exam and cleaning.     See your eye doctor every 1 to 2 years.            Follow-ups after your visit        Additional Services     ORTHOPEDICS ADULT REFERRAL       Your provider has referred you to: [facility] [place] [phone] [email or web address]    Please be aware that coverage of these services is subject to the terms and limitations of your health insurance plan.  Call member services at your health plan with any benefit or coverage questions.      Please bring the following to your appointment:    >>   Any x-rays, CTs or MRIs which have been performed.  Contact the facility where they were done to arrange for  prior to your scheduled appointment.    >>   List of current medications   >>   This referral request   >>   Any documents/labs given to you for this referral                  Who to contact     Normal or non-critical lab and imaging results will be communicated to you by MyChart, letter or phone within 4 business days after the clinic has received the results. If you do not hear from us within 7 days, please contact the clinic through MyChart or phone. If you have a critical or abnormal lab result, we will notify you by phone as soon as possible.  Submit refill requests through [employer] or call your pharmacy and they will forward the refill request to us. Please allow 3 business days for your refill to be completed.          If you need to speak with a  for additional information , please call: [phone]             Additional  "Information About Your Visit        MyChart Information     [employer] lets you send messages to your doctor, view your test results, renew your prescriptions, schedule appointments and more. To sign up, go to [email or web address] . Click on \"Log in\" on the left side of the screen, which will take you to the Welcome page. Then click on \"Sign up Now\" on the right side of the page.     You will be asked to enter the access code listed below, as well as some personal information. Please follow the directions to create your username and password.     Your access code is: [email or web address]  Expires: 3/6/2019  7:54 AM     Your access code will  in 90 days. If you need help or a new code, please call your [place] clinic or [phone].        Care EveryWhere ID     This is your Care EveryWhere ID. This could be used by other organizations to access your [place] medical records  [id number]        Your Vitals Were     Pulse Temperature Height Pulse Oximetry BMI (Body Mass Index)       60 97.2  F (36.2  C) (Tympanic) 5' 9.75\" (1.772 m) 99% 31.74 kg/m2        Blood Pressure from Last 3 Encounters:   18 113/74   17 121/76   16 110/74    Weight from Last 3 Encounters:   18 219 lb 9.6 oz (99.6 kg)   17 220 lb (99.8 kg)   16 222 lb 9.6 oz (101 kg)              We Performed the Following     **TSH with free T4 reflex FUTURE anytime     CBC with platelets differential     GLUCOSE     Hepatitis C Screen Reflex to HCV RNA Quant and Genotype     HIV Screening     Lipid panel reflex to direct LDL Fasting     ORTHOPEDICS ADULT REFERRAL     Spirometry, Breathing Capacity: Normal Order, Clinic Performed        Primary Care Provider Office Phone # Fax #    Zafar Fragoso [phone] [id number] [address] [facility] [address]        Equal Access to Services     EMEKA CARO AH: Jamison Crowe, wakatalina menendez, merlinybjosé miguel ludwig, roosevelt guadarrama " larickey chavira. So [facility] [phone].    ATENCIÓN: Si habla janette, tiene a herrmann disposición servicios gratuitos de asistencia lingüística. Gabe al [phone].    We comply with applicable federal civil rights laws and [place] laws. We do not discriminate on the basis of race, color, national origin, age, disability, sex, sexual orientation, or gender identity.            Thank you!     Thank you for choosing [facility]  for your care. Our goal is always to provide you with excellent care. Hearing back from our patients is one way we can continue to improve our services. Please take a few minutes to complete the written survey that you may receive in the mail after your visit with us. Thank you!             Your Updated Medication List - Protect others around you: Learn how to safely use, store and throw away your medicines at www.disposemymeds.org.          This list is accurate as of 12/6/18  9:13 AM.  Always use your most recent med list.                   Brand Name Dispense Instructions for use Diagnosis    cetirizine 10 MG tablet    zyrTEC     Take 10 mg by mouth daily as needed.        fluticasone 50 MCG/ACT nasal spray    FLONASE    1 Bottle    Spray 1-2 sprays into both nostrils daily    Seasonal allergic rhinitis due to pollen       ibuprofen 200 MG tablet    ADVIL/MOTRIN     Take 200 mg by mouth every 4 hours as needed for mild pain        VITAMIN C PO      Take 1 tablet by mouth daily        vitamin D3 1000 units (25 mcg) tablet    CHOLECALCIFEROL     Take 2,000 mg by mouth daily.

## 2018-12-06 NOTE — PROGRESS NOTES
SUBJECTIVE:   CC: Cristopher Cuellar is an 58 year old male who presents for preventive health visit.     Healthy Habits:    Do you get at least three servings of calcium containing foods daily (dairy, green leafy vegetables, etc.)? yes    Amount of exercise or daily activities, outside of work: 6-7 day(s) per week    Problems taking medications regularly No    Medication side effects: No    Have you had an eye exam in the past two years? yes    Do you see a dentist twice per year? yes    Do you have sleep apnea, excessive snoring or daytime drowsiness?no    Wt Readings from Last 10 Encounters:   12/06/18 99.6 kg (219 lb 9.6 oz)   02/13/17 99.8 kg (220 lb)   06/02/16 101 kg (222 lb 9.6 oz)   05/04/15 100.2 kg (220 lb 12.8 oz)   03/04/15 100.7 kg (222 lb 1.6 oz)   07/11/13 97.8 kg (215 lb 11.2 oz)   05/08/13 99.5 kg (219 lb 4.8 oz)   03/01/13 95.3 kg (210 lb)   05/21/12 99.8 kg (220 lb)     Patient informed that anything we discuss that is not related to preventative medicine, may be billed for; patient verbalizes understanding.    Neck index finger on right numb.   Trigger finger both middle fingers.     **He has some bilateral hand pain and numbness that he would like to address.    **He has been noticing shortness of breath over the past year    **He has an irritation under his skin near left shoulder that comes and goes. It is a burning sensation.  6 months, lasts for 1/2 a day activity does not affect it.     **Wants to discuss x rays on left right side of neck.     **He is also wanting to discuss a hearing.      HEARING FREQUENCY    Right Ear:      1000 Hz RESPONSE- on Level:   20 db  (Conditioning sound)   1000 Hz: RESPONSE- on Level:   20 db    2000 Hz: RESPONSE- on Level:   20 db    4000 Hz: RESPONSE- on Level: 30 db    Left Ear:      4000 Hz: RESPONSE- on Level:   25 db    2000 Hz: RESPONSE- on Level:   20 db    1000 Hz: RESPONSE- on Level:   20 db     500 Hz: RESPONSE- on Level: 20 db    Right Ear:    500  Hz: RESPONSE- on Level: 30 db    Hearing Acuity: Pass    Hearing Assessment: normal      Today's PHQ-2 Score:   PHQ-2 (  [employer]) 2018   Q1: Little interest or pleasure in doing things 0 0   Q2: Feeling down, depressed or hopeless 0 0   PHQ-2 Score 0 0       Abuse: Current or Past(Physical, Sexual or Emotional)- No  Do you feel safe in your environment? Yes    Social History     Tobacco Use     Smoking status: Former Smoker     Types: Cigars     Last attempt to quit: 2014     Years since quittin.1     Smokeless tobacco: Never Used     Tobacco comment: 1 cigar per day during the summer   Substance Use Topics     Alcohol use: No     If you drink alcohol do you typically have >3 drinks per day or >7 drinks per week? No                      Last PSA:   PSA   Date Value Ref Range Status   2012 0.84 ng/mL        Reviewed orders with patient. Reviewed health maintenance and updated orders accordingly - Yes      Reviewed and updated as needed this visit by clinical staff  Tobacco  Allergies  Meds  Med Hx  Surg Hx  Fam Hx  Soc Hx        Reviewed and updated as needed this visit by Provider        Past Medical History:   Diagnosis Date     NO ACTIVE PROBLEMS       Past Surgical History:   Procedure Laterality Date     COLONOSCOPY  3/1/2013    Procedure: COLONOSCOPY;  colonoscopy;  Surgeon: Emani Mckee MD;  Location: [facility]     KNEE SURGERY      Medial meniscus tear, left     NOSE SURGERY      Repair broken nose       ROS:  CONSTITUTIONAL: NEGATIVE for fever, chills, change in weight  INTEGUMENTARY/SKIN: NEGATIVE for worrisome rashes, moles or lesions  EYES: NEGATIVE for vision changes or irritation  ENT: NEGATIVE for ear, mouth and throat problems  RESP: NEGATIVE for significant cough or SOB  CV: NEGATIVE for chest pain, palpitations or peripheral edema  GI: NEGATIVE for nausea, abdominal pain, heartburn, or change in bowel habits   male: negative for dysuria, hematuria,  "decreased urinary stream, erectile dysfunction, urethral discharge  MUSCULOSKELETAL: NEGATIVE for significant arthralgias or myalgia  NEURO: NEGATIVE for weakness, dizziness or paresthesias  PSYCHIATRIC: NEGATIVE for changes in mood or affect    OBJECTIVE:   /74 (BP Location: Right arm, Patient Position: Sitting, Cuff Size: Adult Large)   Pulse 60   Temp 97.2  F (36.2  C) (Tympanic)   Ht 1.772 m (5' 9.75\")   Wt 99.6 kg (219 lb 9.6 oz)   SpO2 99%   BMI 31.74 kg/m    EXAM:  GENERAL: healthy, alert and no distress  NECK: no adenopathy, no asymmetry, masses, or scars and thyroid normal to palpation  RESP: lungs clear to auscultation - no rales, rhonchi or wheezes  CV: regular rate and rhythm, normal S1 S2, no S3 or S4, no murmur, click or rub, no peripheral edema and peripheral pulses strong  ABDOMEN: soft, nontender, no hepatosplenomegaly, no masses and bowel sounds normal  MS: no gross musculoskeletal defects noted, no edema  Hand has bilat trigger finger middle finger   Diagnostic Test Results:  none     ASSESSMENT/PLAN:   1. Routine general medical examination at a health care facility      2. Encounter for routine adult medical exam with abnormal findings      3. Need for hepatitis C screening test    - Hepatitis C Screen Reflex to HCV RNA Quant and Genotype    4. Screening for HIV (human immunodeficiency virus)    - HIV Screening    5. CARDIOVASCULAR SCREENING; LDL GOAL LESS THAN 160    - GLUCOSE  - Lipid panel reflex to direct LDL Fasting    6. Trigger middle finger of right hand    - ORTHOPEDICS ADULT REFERRAL    7. SOB (shortness of breath)  mostlike deconditioning  Patient Instructions   Keep working with chiro on neck  For wrists  Wear wrist splints cock up at nightt.  See Dr. Anguiano [facility] ortho for trigger fingers [facility] [place] [phone] and carple tunnel and wrist pain.        Get pad for keyboard.   Preventive Health Recommendations  Male Ages 50 - 64    Yearly " exam:             See your health care provider every year in order to  o   Review health changes.   o   Discuss preventive care.    o   Review your medicines if your doctor has prescribed any.     Have a cholesterol test every 5 years, or more frequently if you are at risk for high cholesterol/heart disease.     Have a diabetes test (fasting glucose) every three years. If you are at risk for diabetes, you should have this test more often.     Have a colonoscopy at age 50, or have a yearly FIT test (stool test). These exams will check for colon cancer.      Talk with your health care provider about whether or not a prostate cancer screening test (PSA) is right for you.    You should be tested each year for STDs (sexually transmitted diseases), if you re at risk.     Shots: Get a flu shot each year. Get a tetanus shot every 10 years.     Nutrition:    Eat at least 5 servings of fruits and vegetables daily.     Eat whole-grain bread, whole-wheat pasta and brown rice instead of white grains and rice.     Get adequate Calcium and Vitamin D.     Lifestyle    Exercise for at least 150 minutes a week (30 minutes a day, 5 days a week). This will help you control your weight and prevent disease.     Limit alcohol to one drink per day.     No smoking.     Wear sunscreen to prevent skin cancer.     See your dentist every six months for an exam and cleaning.     See your eye doctor every 1 to 2 years.        - CBC with platelets differential  - **TSH with free T4 reflex FUTURE anytime  - Spirometry, Breathing Capacity: Normal Order, Clinic Performed  - Hepatitis C RNA Quantitative    COUNSELING:  Reviewed preventive health counseling, as reflected in patient instructions       Regular exercise       Healthy diet/nutrition       Vision screening       Hearing screening       Colon cancer screening       Prostate cancer screening    BP Readings from Last 1 Encounters:   12/06/18 113/74     Estimated body mass index is 31.74 kg/m   "as calculated from the following:    Height as of this encounter: 1.772 m (5' 9.75\").    Weight as of this encounter: 99.6 kg (219 lb 9.6 oz).    Weight management plan: Discussed healthy diet and exercise guidelines     reports that he quit smoking about 4 years ago. His smoking use included cigars. he has never used smokeless tobacco.      Counseling Resources:  ATP IV Guidelines  Pooled Cohorts Equation Calculator  FRAX Risk Assessment  ICSI Preventive Guidelines  Dietary Guidelines for Americans, 2010  USDA's MyPlate  ASA Prophylaxis  Lung CA Screening    Zafar Fragoso MD  [facility]  "

## 2018-12-07 LAB — HIV 1+2 AB+HIV1 P24 AG SERPL QL IA: NONREACTIVE

## 2018-12-10 LAB
HCV AB SERPL QL IA: REACTIVE
HCV RNA SERPL NAA+PROBE-ACNC: NORMAL [IU]/ML
HCV RNA SERPL NAA+PROBE-LOG IU: NORMAL LOG IU/ML

## 2019-01-04 ENCOUNTER — TRANSFERRED RECORDS (OUTPATIENT)
Dept: HEALTH INFORMATION MANAGEMENT | Facility: CLINIC | Age: 59
End: 2019-01-04

## 2019-01-18 ENCOUNTER — TRANSFERRED RECORDS (OUTPATIENT)
Dept: HEALTH INFORMATION MANAGEMENT | Facility: CLINIC | Age: 59
End: 2019-01-18

## 2019-02-01 ENCOUNTER — HOSPITAL ENCOUNTER (EMERGENCY)
Facility: CLINIC | Age: 59
Discharge: HOME | End: 2019-02-01
Attending: FAMILY MEDICINE | Admitting: FAMILY MEDICINE
Payer: COMMERCIAL

## 2019-02-01 VITALS
BODY MASS INDEX: 30.78 KG/M2 | OXYGEN SATURATION: 98 % | DIASTOLIC BLOOD PRESSURE: 73 MMHG | WEIGHT: 215 LBS | RESPIRATION RATE: 16 BRPM | TEMPERATURE: 99.1 F | SYSTOLIC BLOOD PRESSURE: 117 MMHG | HEART RATE: 77 BPM | HEIGHT: 70 IN

## 2019-02-01 DIAGNOSIS — T58.91XA TOXIC EFFECT OF CARBON MONOXIDE, UNINTENTIONAL, INITIAL ENCOUNTER: ICD-10-CM

## 2019-02-01 LAB
ANION GAP SERPL CALCULATED.3IONS-SCNC: 8 MMOL/L (ref 3–14)
BASOPHILS # BLD AUTO: 0.1 10E9/L (ref 0–0.2)
BASOPHILS NFR BLD AUTO: 0.7 %
BUN SERPL-MCNC: 15 MG/DL (ref 7–30)
CALCIUM SERPL-MCNC: 8.6 MG/DL (ref 8.5–10.1)
CHLORIDE SERPL-SCNC: 107 MMOL/L (ref 94–109)
CO2 SERPL-SCNC: 25 MMOL/L (ref 20–32)
COHGB MFR BLD: 2.6 % (ref 0–2)
COHGB MFR BLD: 28.7 % (ref 0–2)
CREAT SERPL-MCNC: 1.18 MG/DL (ref 0.66–1.25)
DIFFERENTIAL METHOD BLD: NORMAL
EOSINOPHIL # BLD AUTO: 0.1 10E9/L (ref 0–0.7)
EOSINOPHIL NFR BLD AUTO: 1.3 %
ERYTHROCYTE [DISTWIDTH] IN BLOOD BY AUTOMATED COUNT: 11.5 % (ref 10–15)
GFR SERPL CREATININE-BSD FRML MDRD: 67 ML/MIN/{1.73_M2}
GLUCOSE SERPL-MCNC: 106 MG/DL (ref 70–99)
HCT VFR BLD AUTO: 44.5 % (ref 40–53)
HGB BLD-MCNC: 16 G/DL (ref 13.3–17.7)
IMM GRANULOCYTES # BLD: 0 10E9/L (ref 0–0.4)
IMM GRANULOCYTES NFR BLD: 0.4 %
LYMPHOCYTES # BLD AUTO: 1.2 10E9/L (ref 0.8–5.3)
LYMPHOCYTES NFR BLD AUTO: 14.4 %
MCH RBC QN AUTO: 32.6 PG (ref 26.5–33)
MCHC RBC AUTO-ENTMCNC: 36 G/DL (ref 31.5–36.5)
MCV RBC AUTO: 91 FL (ref 78–100)
MONOCYTES # BLD AUTO: 0.9 10E9/L (ref 0–1.3)
MONOCYTES NFR BLD AUTO: 11.5 %
NEUTROPHILS # BLD AUTO: 5.9 10E9/L (ref 1.6–8.3)
NEUTROPHILS NFR BLD AUTO: 71.7 %
NRBC # BLD AUTO: 0 10*3/UL
NRBC BLD AUTO-RTO: 0 /100
PLATELET # BLD AUTO: 211 10E9/L (ref 150–450)
POTASSIUM SERPL-SCNC: 3.9 MMOL/L (ref 3.4–5.3)
RBC # BLD AUTO: 4.91 10E12/L (ref 4.4–5.9)
SODIUM SERPL-SCNC: 140 MMOL/L (ref 133–144)
TROPONIN I SERPL-MCNC: <0.015 UG/L (ref 0–0.04)
WBC # BLD AUTO: 8.2 10E9/L (ref 4–11)

## 2019-02-01 PROCEDURE — 93005 ELECTROCARDIOGRAM TRACING: CPT | Performed by: FAMILY MEDICINE

## 2019-02-01 PROCEDURE — 82375 ASSAY CARBOXYHB QUANT: CPT | Performed by: EMERGENCY MEDICINE

## 2019-02-01 PROCEDURE — 84484 ASSAY OF TROPONIN QUANT: CPT | Performed by: FAMILY MEDICINE

## 2019-02-01 PROCEDURE — 99284 EMERGENCY DEPT VISIT MOD MDM: CPT | Performed by: FAMILY MEDICINE

## 2019-02-01 PROCEDURE — 93010 ELECTROCARDIOGRAM REPORT: CPT | Mod: Z6 | Performed by: FAMILY MEDICINE

## 2019-02-01 PROCEDURE — 85025 COMPLETE CBC W/AUTO DIFF WBC: CPT | Performed by: FAMILY MEDICINE

## 2019-02-01 PROCEDURE — 82375 ASSAY CARBOXYHB QUANT: CPT | Performed by: FAMILY MEDICINE

## 2019-02-01 PROCEDURE — 99284 EMERGENCY DEPT VISIT MOD MDM: CPT | Mod: 25 | Performed by: FAMILY MEDICINE

## 2019-02-01 PROCEDURE — 80048 BASIC METABOLIC PNL TOTAL CA: CPT | Performed by: FAMILY MEDICINE

## 2019-02-01 ASSESSMENT — ENCOUNTER SYMPTOMS
BLOOD IN STOOL: 0
SHORTNESS OF BREATH: 0
COUGH: 0
HEADACHES: 1
PHOTOPHOBIA: 0
WHEEZING: 0
PALPITATIONS: 0
SINUS PRESSURE: 0
CONSTIPATION: 0
FATIGUE: 1
SORE THROAT: 0
DIARRHEA: 0
ABDOMINAL PAIN: 0
NAUSEA: 1
DIAPHORESIS: 0
FREQUENCY: 0
FEVER: 0
VOMITING: 1
CHILLS: 0
DYSURIA: 0
DIZZINESS: 1

## 2019-02-01 ASSESSMENT — MIFFLIN-ST. JEOR: SCORE: 1791.48

## 2019-02-01 NOTE — ED PROVIDER NOTES
History     Chief Complaint   Patient presents with     Vomiting     Headache     HPI  Cristopher Cuellar is a 58 year old male who presents with headache and vomiting and he does not typically have headaches.  He is accompanied by his wife who also has a headache and they have a dog at home that appears ill.  Patient has been and out of the home over the last couple of days including going to work.  Overnight he developed a circumferential headache moderate intensity with at least one episode of vomiting.  Felt dizzy generally weak malaise and chills last evening.  No associated fever.  No significant travel.  No other exposures.  Concerns for possible carbon monoxide poisoning.  They do not have carbon monoxide detectors at home.      Allergies:  Allergies   Allergen Reactions     Penicillins        Problem List:    Patient Active Problem List    Diagnosis Date Noted     Advanced directives, counseling/discussion 06/02/2016     Priority: Medium     Advance Care Planning 6/2/2016: ACP Review of Chart / Resources Provided:  Reviewed chart for advance care plan.  Cristopher Cuellar has no plan or code status on file. Discussed available resources and provided with information.Added by Shania Levine         CARDIOVASCULAR SCREENING; LDL GOAL LESS THAN 160 07/01/2013     Priority: Medium     Obesity 05/21/2012     Priority: Medium     Problem list name updated by automated process. Provider to review       Health Care Home 04/23/2013     Priority: Low     EMERGENCY CARE PLAN  April 23, 2013: No current Care Coordination follow up planned. Please refer if Care Coordination services are needed.    Presenting Problem Signs and Symptoms Treatment Plan   Questions or concerns   during clinic hours   I will call my clinic directly:  [facility]  [address] [place] [address] [id number] [phone].    Questions or concerns outside clinic hours   I will call the 24 hour nurse line at   [phone] or [phone].    Need to schedule an appointment   I will call the 24 hour scheduling team at [phone] or my clinic directly at [phone].    Same day treatment     I will call my clinic first, nurse line if after hours, urgent care and express care if needed.   Clinic care coordination services (regular clinic hours)     I will call a clinic care coordinator directly:     Humble Hobson RN  Mon, Tues, Fri - [phone]  Wed, Th - [phone]    Rahel Singleton :    [phone]    Or call my clinic at [phone] and ask to speak with care coordination.   Crisis Services: Behavioral or Mental Health  [facility] 24 Hour Phone Line  [phone]    [facility] 24 Hour Crisis Services  [phone]    [facility] [phone]    [facility]   [phone]       Emergency treatment -- Immediately    CAll 361             Past Medical History:    Past Medical History:   Diagnosis Date     Allergic state      NO ACTIVE PROBLEMS        Past Surgical History:    Past Surgical History:   Procedure Laterality Date     COLONOSCOPY  3/1/2013    Procedure: COLONOSCOPY;  colonoscopy;  Surgeon: Emani Mckee MD;  Location: [facility]     KNEE SURGERY      Medial meniscus tear, left     NOSE SURGERY      Repair broken nose       Family History:    Family History   Problem Relation Age of Onset     Cancer Father         Lung     Heart Disease Father         MI     Heart Disease Paternal Grandfather         MI       Social History:  Marital Status:   [2]  Social History     Tobacco Use     Smoking status: Former Smoker     Types: Cigars     Last attempt to quit: 2014     Years since quittin.2     Smokeless tobacco: Never Used     Tobacco comment: 1 cigar per day during the summer   Substance Use Topics     Alcohol use: No     Drug use: No        Medications:      ibuprofen (ADVIL,MOTRIN) 200 MG tablet   Ascorbic Acid (VITAMIN C PO)   cetirizine (ZYRTEC)  "10 MG tablet   Cholecalciferol (VITAMIN D3) 1000 UNIT TABS   fluticasone (FLONASE) 50 MCG/ACT spray         Review of Systems   Constitutional: Positive for fatigue. Negative for chills, diaphoresis and fever.   HENT: Negative for ear pain, sinus pressure and sore throat.    Eyes: Negative for photophobia and visual disturbance.   Respiratory: Negative for cough, shortness of breath and wheezing.    Cardiovascular: Negative for chest pain and palpitations.   Gastrointestinal: Positive for nausea and vomiting. Negative for abdominal pain, blood in stool, constipation and diarrhea.   Genitourinary: Negative for dysuria, frequency and urgency.   Skin: Negative for rash.   Neurological: Positive for dizziness and headaches.   All other systems reviewed and are negative.      Physical Exam   BP: 128/82  Pulse: 100  Resp: 16  Height: 177.8 cm (5' 10\")  Weight: 97.5 kg (215 lb)  SpO2: 96 %      Physical Exam   Constitutional: He appears distressed.   HENT:   Mouth/Throat: Oropharynx is clear and moist.   Eyes: Conjunctivae are normal.   Cardiovascular: Normal rate, regular rhythm and normal heart sounds.   No murmur heard.  Pulmonary/Chest: Effort normal and breath sounds normal. No stridor. No respiratory distress. He has no wheezes.   Abdominal: Soft. Bowel sounds are normal. He exhibits no distension and no mass. There is no tenderness. There is no guarding.   Musculoskeletal: He exhibits no edema.   Neurological: He is alert. No cranial nerve deficit or sensory deficit. He exhibits normal muscle tone.   Skin: No rash noted. He is not diaphoretic. No pallor.       ED Course        Procedures                  EKG Interpretation:      Interpreted by Andres Renee MD    EKG done at 0915 hrs. demonstrates a sinus rhythm at 73 bpm with a normal axis and no ST change.  No T wave changes.  Normal R progression and no Q waves.  Normal intervals.  Normal conduction.  No ectopy.  Impression sinus rhythm 73 bpm and no old EKG to " compare.      Critical Care time:  none               Results for orders placed or performed during the hospital encounter of 02/01/19 (from the past 24 hour(s))   CBC with platelets differential   Result Value Ref Range    WBC 8.2 4.0 - 11.0 10e9/L    RBC Count 4.91 4.4 - 5.9 10e12/L    Hemoglobin 16.0 13.3 - 17.7 g/dL    Hematocrit 44.5 40.0 - 53.0 %    MCV 91 78 - 100 fl    MCH 32.6 26.5 - 33.0 pg    MCHC 36.0 31.5 - 36.5 g/dL    RDW 11.5 10.0 - 15.0 %    Platelet Count 211 150 - 450 10e9/L    Diff Method Automated Method     % Neutrophils 71.7 %    % Lymphocytes 14.4 %    % Monocytes 11.5 %    % Eosinophils 1.3 %    % Basophils 0.7 %    % Immature Granulocytes 0.4 %    Nucleated RBCs 0 0 /100    Absolute Neutrophil 5.9 1.6 - 8.3 10e9/L    Absolute Lymphocytes 1.2 0.8 - 5.3 10e9/L    Absolute Monocytes 0.9 0.0 - 1.3 10e9/L    Absolute Eosinophils 0.1 0.0 - 0.7 10e9/L    Absolute Basophils 0.1 0.0 - 0.2 10e9/L    Abs Immature Granulocytes 0.0 0 - 0.4 10e9/L    Absolute Nucleated RBC 0.0    Basic metabolic panel   Result Value Ref Range    Sodium 140 133 - 144 mmol/L    Potassium 3.9 3.4 - 5.3 mmol/L    Chloride 107 94 - 109 mmol/L    Carbon Dioxide 25 20 - 32 mmol/L    Anion Gap 8 3 - 14 mmol/L    Glucose 106 (H) 70 - 99 mg/dL    Urea Nitrogen 15 7 - 30 mg/dL    Creatinine 1.18 0.66 - 1.25 mg/dL    GFR Estimate 67 >60 mL/min/[1.73_m2]    GFR Estimate If Black 77 >60 mL/min/[1.73_m2]    Calcium 8.6 8.5 - 10.1 mg/dL   Carbon monoxide   Result Value Ref Range    Carbon Monoxide 28.7 (HH) 0 - 2 %   Troponin I   Result Value Ref Range    Troponin I ES <0.015 0.000 - 0.045 ug/L   Carbon monoxide   Result Value Ref Range    Carbon Monoxide 2.6 (H) 0 - 2 %       Medications - No data to display        Assessments & Plan (with Medical Decision Making)     MDM who presented with: Cristopher Cuellar is a 58 year old male suspected carbon monoxide poisoning after his wife and dog appeared to be ill.  His wife presented here  also with headache dizziness and symptoms that started while in the home and were better when the left their home.  Carbon monoxide was confirmed to be 28%.  Normal neurologic exam.  Did have a headache that improved significantly on oxygen.    9:16 AM  Discussed with Dr. Elier Phillips at [facility], at the dive chamber and at this point unless troponin is elevated would not recommend the barochamber.  He recommended continuing high flow oxygen for 6 hours and rechecking a carbon monoxide level at that point.  Goal is less than 5%.  Will also need follow-up clinic for reevaluation regarding persistent symptoms.    The patient was observed until 3 PM when carbon monoxide was rechecked.  At this point he had been on nonrebreather at 15 L/min for 6 hours.  He is asymptomatic at this time.  Recheck carbon monoxide was at less than 3% at the time of his discharge.    I have reviewed the nursing notes.    I have reviewed the findings, diagnosis, plan and need for follow up with the patient.          Medication List      There are no discharge medications for this visit.         Final diagnoses:   Toxic effect of carbon monoxide, unintentional, initial encounter - return for recurrent symptoms.  follow-up in clininc.  do not be re-exposed to carbon monoxide.  keep detectors in your living spaces       2/1/2019   [facility] EMERGENCY DEPARTMENT     Andres Renee MD  02/01/19 0948

## 2019-02-01 NOTE — ED NOTES
Contact Poison Control, recommended adding troponin and EKG, repeat carbon monoxide lab testing in 6 hours. MD updated.

## 2019-02-01 NOTE — ED TRIAGE NOTES
Pt arrives with c/o vomiting and a severe headache. Spouse with similar symptoms. Here for presumed Carbon Monoxide poisoning. VSS..

## 2019-02-01 NOTE — DISCHARGE INSTRUCTIONS
ICD-10-CM    1. Toxic effect of carbon monoxide, unintentional, initial encounter T58.91XA Carbon monoxide    return for recurrent symptoms.  follow-up in clininc.  do not be re-exposed to carbon monoxide.  keep detectors in your living spaces

## 2019-02-01 NOTE — ED AVS SNAPSHOT
[facility] Emergency Department  [address] [address]  Phone:  [phone]  Fax:  [phone]                                    Cristopher Cuellar   MRN: [id number]    Department:  [facility] Emergency Department   Date of Visit:  2/1/2019           After Visit Summary Signature Page    I have received my discharge instructions, and my questions have been answered. I have discussed any challenges I see with this plan with the nurse or doctor.    ..........................................................................................................................................  Patient/Patient Representative Signature      ..........................................................................................................................................  Patient Representative Print Name and Relationship to Patient    ..................................................               ................................................  Date                                   Time    ..........................................................................................................................................  Reviewed by Signature/Title    ...................................................              ..............................................  Date                                               Time          22EPIC Rev 08/18

## 2019-02-01 NOTE — ED NOTES
DATE:  2/1/2019   TIME OF RECEIPT FROM LAB:  0852  LAB TEST:  Carbon Monoxide  LAB VALUE:  28.7%  RESULTS GIVEN WITH READ-BACK TO (PROVIDER):  Andres Renee MD  TIME LAB VALUE REPORTED TO PROVIDER:   0853

## 2019-02-01 NOTE — ED PROVIDER NOTES
Is a 58-year-old male presents emergency for headache.  Found to have elevated carbon monoxide level.  Other family members with similar complaints.  Initial level was 28.7.  With O2 therapy that symptoms completely resolved.  Repeat level was 2.6.  The home has been checked by [employer].  They are currently seeing another at home.  Note on carbon monoxide poisoning is provided.  Reasons to return for reassessment discussed.     Kyler Harding MD  02/01/19 2157

## 2019-02-04 ENCOUNTER — OFFICE VISIT (OUTPATIENT)
Dept: FAMILY MEDICINE | Facility: CLINIC | Age: 59
End: 2019-02-04
Payer: COMMERCIAL

## 2019-02-04 VITALS
TEMPERATURE: 97 F | BODY MASS INDEX: 32.13 KG/M2 | SYSTOLIC BLOOD PRESSURE: 124 MMHG | DIASTOLIC BLOOD PRESSURE: 79 MMHG | WEIGHT: 224.4 LBS | HEIGHT: 70 IN | RESPIRATION RATE: 16 BRPM | HEART RATE: 72 BPM

## 2019-02-04 DIAGNOSIS — Z01.818 PREOP GENERAL PHYSICAL EXAM: Primary | ICD-10-CM

## 2019-02-04 PROCEDURE — 99214 OFFICE O/P EST MOD 30 MIN: CPT | Performed by: FAMILY MEDICINE

## 2019-02-04 ASSESSMENT — MIFFLIN-ST. JEOR: SCORE: 1830.15

## 2019-02-04 ASSESSMENT — PAIN SCALES - GENERAL: PAINLEVEL: NO PAIN (0)

## 2019-02-04 NOTE — PROGRESS NOTES
[facility]  [address] [address]  [phone]  Dept: [phone]    PRE-OP EVALUATION:  Today's date: 2019    Cristopher Cuellar (: 1960) presents for pre-operative evaluation assessment as requested by Dr. Anguiano.  He requires evaluation and anesthesia risk assessment prior to undergoing surgery/procedure for treatment of Right wrist.    Proposed Surgery/ Procedure: Carpel tunnel of right wrist and 3rd and 4th trigger release.  Date of Surgery/ Procedure: 2019  Time of Surgery/ Procedure: 11:00 am  Hospital/Surgical Facility: [facility]  Fax number for surgical facility: [phone]  Primary Physician: Zafar Fragoso  Type of Anesthesia Anticipated: local    Patient has a Health Care Directive or Living Will:  NO    1. NO - Do you have a history of heart attack, stroke, stent, bypass or surgery on an artery in the head, neck, heart or legs?  2. NO - Do you ever have any pain or discomfort in your chest?  3. NO - Do you have a history of  Heart Failure?  4. NO - Are you troubled by shortness of breath when: walking on the level, up a slight hill or at night?  5. NO - Do you currently have a cold, bronchitis or other respiratory infection?  6. NO - Do you have a cough, shortness of breath or wheezing?  7. NO - Do you sometimes get pains in the calves of your legs when you walk?  8. NO - Do you or anyone in your family have previous history of blood clots?  9. NO - Do you or does anyone in your family have a serious bleeding problem such as prolonged bleeding following surgeries or cuts?  10. NO - Have you ever had problems with anemia or been told to take iron pills?  11. NO - Have you had any abnormal blood loss such as black, tarry or bloody stools, or abnormal vaginal bleeding?  12. NO - Have you ever had a blood transfusion?  13. NO - Have you or any of your relatives ever had problems with anesthesia?  14. NO - Do you have sleep apnea,  excessive snoring or daytime drowsiness?  15. NO - Do you have any prosthetic heart valves?  16. NO - Do you have prosthetic joints?  17. NO - Is there any chance that you may be pregnant?      HPI:     HPI related to upcoming procedure: has long standing CTS.      See problem list for active medical problems.  Problems all longstanding and stable, except as noted/documented.  See ROS for pertinent symptoms related to these conditions.                                                                                                                                                          .    MEDICAL HISTORY:     Patient Active Problem List    Diagnosis Date Noted     Advanced directives, counseling/discussion 06/02/2016     Priority: Medium     Advance Care Planning 6/2/2016: ACP Review of Chart / Resources Provided:  Reviewed chart for advance care plan.  Cristopher Cuellar has no plan or code status on file. Discussed available resources and provided with information.Added by Shania Levine         CARDIOVASCULAR SCREENING; LDL GOAL LESS THAN 160 07/01/2013     Priority: Medium     Obesity 05/21/2012     Priority: Medium     Problem list name updated by automated process. Provider to review       Health Care Home 04/23/2013     Priority: Low     EMERGENCY CARE PLAN  April 23, 2013: No current Care Coordination follow up planned. Please refer if Care Coordination services are needed.    Presenting Problem Signs and Symptoms Treatment Plan   Questions or concerns   during clinic hours   I will call my clinic directly:  [facility]  [address] [place] [address] [id number] [phone].    Questions or concerns outside clinic hours   I will call the 24 hour nurse line at   [phone] or [facility].   Need to schedule an appointment   I will call the 24 hour scheduling team at [phone] or my clinic directly at [phone].    Same day treatment     I will call my clinic first, nurse line if after  hours, urgent care and express care if needed.   Clinic care coordination services (regular clinic hours)     I will call a clinic care coordinator directly:     Humble Hobson RN  Mon, Tues, Fri - [phone]  Wed, Thurs - [phone]    Rahel Singleton, :    [phone]    Or call my clinic at [phone] and ask to speak with care coordination.   Crisis Services: Behavioral or Mental Health  [facility]is Connection 24 Hour Phone Line  [phone]    [facility] 24 Hour Crisis Services  [phone]    [facility] ([facility]) Network [phone]    [facility]   [phone]       Emergency treatment -- Immediately    CAll 082           Past Medical History:   Diagnosis Date     Allergic state      NO ACTIVE PROBLEMS      Past Surgical History:   Procedure Laterality Date     COLONOSCOPY  3/1/2013    Procedure: COLONOSCOPY;  colonoscopy;  Surgeon: Emani Mckee MD;  Location: [facility]     KNEE SURGERY      Medial meniscus tear, left     NOSE SURGERY      Repair broken nose     Current Outpatient Medications   Medication Sig Dispense Refill     Ascorbic Acid (VITAMIN C PO) Take 1 tablet by mouth daily       cetirizine (ZYRTEC) 10 MG tablet Take 10 mg by mouth daily as needed.       Cholecalciferol (VITAMIN D3) 1000 UNIT TABS Take 2,000 mg by mouth daily.       fluticasone (FLONASE) 50 MCG/ACT spray Spray 1-2 sprays into both nostrils daily 1 Bottle 11     ibuprofen (ADVIL,MOTRIN) 200 MG tablet Take 200 mg by mouth every 4 hours as needed for mild pain       OTC products: None, except as noted above    Allergies   Allergen Reactions     Penicillins       Latex Allergy: NO    Social History     Tobacco Use     Smoking status: Former Smoker     Types: Cigars     Last attempt to quit: 2014     Years since quittin.2     Smokeless tobacco: Never Used     Tobacco comment: 1 cigar per day during the summer   Substance Use Topics     Alcohol use: No     History   Drug Use No        REVIEW OF SYSTEMS:   CONSTITUTIONAL: NEGATIVE for fever, chills, change in weight  ENT/MOUTH: NEGATIVE for ear, mouth and throat problems  RESP: NEGATIVE for significant cough or SOB  CV: NEGATIVE for chest pain, palpitations or peripheral edema    EXAM:   There were no vitals taken for this visit.  GENERAL APPEARANCE: healthy, alert and no distress  HENT: ear canals and TM's normal and nose and mouth without ulcers or lesions  RESP: lungs clear to auscultation - no rales, rhonchi or wheezes  CV: regular rate and rhythm, normal S1 S2, no S3 or S4 and no murmur, click or rub   ABDOMEN: soft, nontender, no HSM or masses and bowel sounds normal  NEURO: Normal strength and tone, sensory exam grossly normal, mentation intact and speech normal    DIAGNOSTICS:   EKG: appears normal, NSR, normal axis, normal intervals, no acute ST/T changes c/w ischemia, no LVH by voltage criteria, unchanged from previous tracings    Recent Labs   Lab Test 02/01/19  0837 12/06/18  0918 01/11/12   HGB 16.0 16.6  --     219  --      --   --    POTASSIUM 3.9  --   --    CR 1.18  --   --    A1C  --   --  5.20     IMPRESSION:   Reason for surgery/procedure: carpel tunnel syndrome on right     Diagnosis/reason for consult: pre op clearance    The proposed surgical procedure is considered INTERMEDIATE risk.    REVISED CARDIAC RISK INDEX  The patient has the following serious cardiovascular risks for perioperative complications such as (MI, PE, VFib and 3  AV Block):  No serious cardiac risks  INTERPRETATION: 0 risks: Class I (very low risk - 0.4% complication rate)    The patient has the following additional risks for perioperative complications:  No identified additional risks      ICD-10-CM    1. Preop general physical exam Z01.818        RECOMMENDATIONS:     --Consult hospital rounder / IM to assist post-op medical management    --Patient is to take all scheduled medications on the day of surgery EXCEPT for modifications  listed below.    APPROVAL GIVEN to proceed with proposed procedure, without further diagnostic evaluation       Signed Electronically by: Zafar Fragoso MD    Copy of this evaluation report is provided to requesting physician.    [place] Preop Guidelines    Revised Cardiac Risk Index

## 2019-02-22 ENCOUNTER — TRANSFERRED RECORDS (OUTPATIENT)
Dept: HEALTH INFORMATION MANAGEMENT | Facility: CLINIC | Age: 59
End: 2019-02-22

## 2019-03-22 ENCOUNTER — TRANSFERRED RECORDS (OUTPATIENT)
Dept: HEALTH INFORMATION MANAGEMENT | Facility: CLINIC | Age: 59
End: 2019-03-22

## 2019-07-12 ENCOUNTER — TRANSFERRED RECORDS (OUTPATIENT)
Dept: HEALTH INFORMATION MANAGEMENT | Facility: CLINIC | Age: 59
End: 2019-07-12

## 2019-11-18 ENCOUNTER — TRANSFERRED RECORDS (OUTPATIENT)
Dept: HEALTH INFORMATION MANAGEMENT | Facility: CLINIC | Age: 59
End: 2019-11-18

## 2019-12-02 ENCOUNTER — TRANSFERRED RECORDS (OUTPATIENT)
Dept: HEALTH INFORMATION MANAGEMENT | Facility: CLINIC | Age: 59
End: 2019-12-02

## 2019-12-15 ENCOUNTER — HEALTH MAINTENANCE LETTER (OUTPATIENT)
Age: 59
End: 2019-12-15

## 2020-02-19 ENCOUNTER — OFFICE VISIT (OUTPATIENT)
Dept: FAMILY MEDICINE | Facility: CLINIC | Age: 60
End: 2020-02-19
Payer: COMMERCIAL

## 2020-02-19 VITALS
WEIGHT: 224.4 LBS | TEMPERATURE: 97.3 F | BODY MASS INDEX: 32.13 KG/M2 | HEART RATE: 60 BPM | SYSTOLIC BLOOD PRESSURE: 115 MMHG | RESPIRATION RATE: 16 BRPM | DIASTOLIC BLOOD PRESSURE: 79 MMHG | HEIGHT: 70 IN

## 2020-02-19 DIAGNOSIS — Z00.00 ROUTINE GENERAL MEDICAL EXAMINATION AT A HEALTH CARE FACILITY: ICD-10-CM

## 2020-02-19 DIAGNOSIS — Z00.01 ENCOUNTER FOR ROUTINE ADULT MEDICAL EXAM WITH ABNORMAL FINDINGS: ICD-10-CM

## 2020-02-19 DIAGNOSIS — Z13.220 LIPID SCREENING: Primary | ICD-10-CM

## 2020-02-19 DIAGNOSIS — Z12.5 SCREENING FOR PROSTATE CANCER: ICD-10-CM

## 2020-02-19 LAB
CHOLEST SERPL-MCNC: 236 MG/DL
GLUCOSE SERPL-MCNC: 92 MG/DL (ref 70–99)
HDLC SERPL-MCNC: 46 MG/DL
LDLC SERPL CALC-MCNC: 150 MG/DL
NONHDLC SERPL-MCNC: 190 MG/DL
PSA SERPL-ACNC: 1.42 UG/L (ref 0–4)
TRIGL SERPL-MCNC: 201 MG/DL

## 2020-02-19 PROCEDURE — 80061 LIPID PANEL: CPT | Performed by: FAMILY MEDICINE

## 2020-02-19 PROCEDURE — 82947 ASSAY GLUCOSE BLOOD QUANT: CPT | Performed by: FAMILY MEDICINE

## 2020-02-19 PROCEDURE — 36415 COLL VENOUS BLD VENIPUNCTURE: CPT | Performed by: FAMILY MEDICINE

## 2020-02-19 PROCEDURE — G0103 PSA SCREENING: HCPCS | Performed by: FAMILY MEDICINE

## 2020-02-19 PROCEDURE — 99396 PREV VISIT EST AGE 40-64: CPT | Performed by: FAMILY MEDICINE

## 2020-02-19 RX ORDER — MELOXICAM 7.5 MG/1
TABLET ORAL
COMMUNITY
Start: 2020-01-27 | End: 2020-06-08

## 2020-02-19 ASSESSMENT — PAIN SCALES - GENERAL: PAINLEVEL: MILD PAIN (3)

## 2020-02-19 ASSESSMENT — MIFFLIN-ST. JEOR: SCORE: 1825.15

## 2020-02-19 NOTE — PROGRESS NOTES
SUBJECTIVE:   CC: Cristopher Cuellar is an 59 year old male who presents for preventive health visit.     Healthy Habits:    Do you get at least three servings of calcium containing foods daily (dairy, green leafy vegetables, etc.)? no, taking calcium and/or vitamin D supplement: no    Amount of exercise or daily activities, outside of work: 2 day(s) per week    Problems taking medications regularly No    Medication side effects: No    Have you had an eye exam in the past two years? yes    Do you see a dentist twice per year? yes    Do you have sleep apnea, excessive snoring or daytime drowsiness?no    Wt Readings from Last 10 Encounters:   20 101.8 kg (224 lb 6.4 oz)   19 101.8 kg (224 lb 6.4 oz)   19 97.5 kg (215 lb)   18 99.6 kg (219 lb 9.6 oz)   17 99.8 kg (220 lb)   16 101 kg (222 lb 9.6 oz)   05/04/15 100.2 kg (220 lb 12.8 oz)   03/04/15 100.7 kg (222 lb 1.6 oz)   13 97.8 kg (215 lb 11.2 oz)   13 99.5 kg (219 lb 4.8 oz)     Patient informed that anything we discuss that is not related to preventative medicine, may be billed for; patient verbalizes understanding.    -He is needing lab work.    -He has john having more indigestion lately.     Today's PHQ-2 Score:   PHQ-2 (  [employer]) 2020   Q1: Little interest or pleasure in doing things 0 0   Q2: Feeling down, depressed or hopeless 0 0   PHQ-2 Score 0 0     Abuse: Current or Past(Physical, Sexual or Emotional)- No  Do you feel safe in your environment? Yes      Social History     Tobacco Use     Smoking status: Former Smoker     Types: Cigars     Last attempt to quit: 2014     Years since quittin.3     Smokeless tobacco: Never Used     Tobacco comment: 1 cigar per day during the summer   Substance Use Topics     Alcohol use: No     If you drink alcohol do you typically have >3 drinks per day or >7 drinks per week? No                      Last PSA:   PSA   Date Value Ref Range Status    2012 0.84 ng/mL      Reviewed orders with patient. Reviewed health maintenance and updated orders accordingly - Yes  Labs reviewed in EPIC  BP Readings from Last 3 Encounters:   20 115/79   19 124/79   19 117/73    Wt Readings from Last 3 Encounters:   20 101.8 kg (224 lb 6.4 oz)   19 101.8 kg (224 lb 6.4 oz)   19 97.5 kg (215 lb)             Patient Active Problem List   Diagnosis     Obesity     Health Care Home     CARDIOVASCULAR SCREENING; LDL GOAL LESS THAN 160     Advanced directives, counseling/discussion     Past Surgical History:   Procedure Laterality Date     COLONOSCOPY  3/1/2013    Procedure: COLONOSCOPY;  colonoscopy;  Surgeon: Emani Mckee MD;  Location: [facility]     KNEE SURGERY      Medial meniscus tear, left     NOSE SURGERY      Repair broken nose       Social History     Tobacco Use     Smoking status: Former Smoker     Types: Cigars     Last attempt to quit: 2014     Years since quittin.3     Smokeless tobacco: Never Used     Tobacco comment: 1 cigar per day during the summer   Substance Use Topics     Alcohol use: No     Family History   Problem Relation Age of Onset     Cancer Father         Lung     Heart Disease Father         MI     Heart Disease Paternal Grandfather         MI         Current Outpatient Medications   Medication Sig Dispense Refill     Ascorbic Acid (VITAMIN C PO) Take 1 tablet by mouth daily       cetirizine (ZYRTEC) 10 MG tablet Take 10 mg by mouth daily as needed.       Cholecalciferol (VITAMIN D3) 1000 UNIT TABS Take 2,000 mg by mouth daily.       fluticasone (FLONASE) 50 MCG/ACT spray Spray 1-2 sprays into both nostrils daily 1 Bottle 11     ibuprofen (ADVIL,MOTRIN) 200 MG tablet Take 200 mg by mouth every 4 hours as needed for mild pain       meloxicam 7.5 MG PO tablet          Reviewed and updated as needed this visit by clinical staff  Tobacco  Allergies  Meds  Med Hx  Surg Hx  Fam Hx  Soc Hx     "    Reviewed and updated as needed this visit by Provider       ROS:  CONSTITUTIONAL: NEGATIVE for fever, chills, change in weight  INTEGUMENTARY/SKIN: NEGATIVE for worrisome rashes, moles or lesions  EYES: NEGATIVE for vision changes or irritation  ENT: NEGATIVE for ear, mouth and throat problems  RESP: NEGATIVE for significant cough or SOB  CV: NEGATIVE for chest pain, palpitations or peripheral edema  GI: NEGATIVE for nausea, abdominal pain, heartburn, or change in bowel habits   male: negative for dysuria, hematuria, decreased urinary stream, erectile dysfunction, urethral discharge  MUSCULOSKELETAL: NEGATIVE for significant arthralgias or myalgia  NEURO: NEGATIVE for weakness, dizziness or paresthesias  PSYCHIATRIC: NEGATIVE for changes in mood or affect    OBJECTIVE:   /79   Pulse 60   Temp 97.3  F (36.3  C) (Tympanic)   Resp 16   Ht 1.772 m (5' 9.75\")   Wt 101.8 kg (224 lb 6.4 oz)   BMI 32.43 kg/m    EXAM:  GENERAL: healthy, alert and no distress  NECK: no adenopathy, no asymmetry, masses, or scars and thyroid normal to palpation  RESP: lungs clear to auscultation - no rales, rhonchi or wheezes  CV: regular rate and rhythm, normal S1 S2, no S3 or S4, no murmur, click or rub, no peripheral edema and peripheral pulses strong  ABDOMEN: soft, nontender, no hepatosplenomegaly, no masses and bowel sounds normal  MS: no gross musculoskeletal defects noted, no edema    Diagnostic Test Results:  Labs reviewed in Epic    ASSESSMENT/PLAN:   1. Routine general medical examination at a health care facility    2. Encounter for routine adult medical exam with abnormal findings      3. Lipid screening    - Lipid panel reflex to direct LDL Fasting    The 10-year ASCVD risk score (Thad SIMEON Jr., et al., 2013) is: 8.6%    Values used to calculate the score:      Age: 59 years      Sex: Male      Is Non- : No      Diabetic: No      Tobacco smoker: No      Systolic Blood Pressure: 115 mmHg      " "Is BP treated: No      HDL Cholesterol: 48 mg/dL      Total Cholesterol: 217 mg/dL    COUNSELING:  Reviewed preventive health counseling, as reflected in patient instructions       Regular exercise       Healthy diet/nutrition       Vision screening       Hearing screening       Colon cancer screening       Prostate cancer screening    Estimated body mass index is 32.43 kg/m  as calculated from the following:    Height as of this encounter: 1.772 m (5' 9.75\").    Weight as of this encounter: 101.8 kg (224 lb 6.4 oz).    Weight management plan: Discussed healthy diet and exercise guidelines     reports that he quit smoking about 5 years ago. His smoking use included cigars. He has never used smokeless tobacco.      Counseling Resources:  ATP IV Guidelines  Pooled Cohorts Equation Calculator  FRAX Risk Assessment  ICSI Preventive Guidelines  Dietary Guidelines for Americans, 2010  USDA's MyPlate  ASA Prophylaxis  Lung CA Screening    Zafar Fragoso MD  [facility]  "

## 2020-05-20 ENCOUNTER — APPOINTMENT (OUTPATIENT)
Dept: CT IMAGING | Facility: CLINIC | Age: 60
End: 2020-05-20
Attending: FAMILY MEDICINE
Payer: COMMERCIAL

## 2020-05-20 ENCOUNTER — HOSPITAL ENCOUNTER (EMERGENCY)
Facility: CLINIC | Age: 60
Discharge: HOME | End: 2020-05-20
Attending: FAMILY MEDICINE | Admitting: FAMILY MEDICINE
Payer: COMMERCIAL

## 2020-05-20 ENCOUNTER — NURSE TRIAGE (OUTPATIENT)
Dept: NURSING | Facility: CLINIC | Age: 60
End: 2020-05-20

## 2020-05-20 VITALS
OXYGEN SATURATION: 96 % | DIASTOLIC BLOOD PRESSURE: 67 MMHG | WEIGHT: 205 LBS | TEMPERATURE: 98.5 F | SYSTOLIC BLOOD PRESSURE: 133 MMHG | RESPIRATION RATE: 12 BRPM | HEART RATE: 75 BPM | BODY MASS INDEX: 29.63 KG/M2

## 2020-05-20 DIAGNOSIS — R07.9 CHEST PAIN, UNSPECIFIED TYPE: ICD-10-CM

## 2020-05-20 DIAGNOSIS — I26.99 ACUTE PULMONARY EMBOLISM WITHOUT ACUTE COR PULMONALE, UNSPECIFIED PULMONARY EMBOLISM TYPE (H): ICD-10-CM

## 2020-05-20 LAB
ALBUMIN SERPL-MCNC: 3.4 G/DL (ref 3.4–5)
ALP SERPL-CCNC: 101 U/L (ref 40–150)
ALT SERPL W P-5'-P-CCNC: 26 U/L (ref 0–70)
ANION GAP SERPL CALCULATED.3IONS-SCNC: 8 MMOL/L (ref 3–14)
APTT PPP: 37 SEC (ref 22–37)
AST SERPL W P-5'-P-CCNC: 16 U/L (ref 0–45)
BASOPHILS # BLD AUTO: 0 10E9/L (ref 0–0.2)
BASOPHILS NFR BLD AUTO: 0.4 %
BILIRUB SERPL-MCNC: 0.7 MG/DL (ref 0.2–1.3)
BUN SERPL-MCNC: 17 MG/DL (ref 7–30)
CALCIUM SERPL-MCNC: 8.9 MG/DL (ref 8.5–10.1)
CHLORIDE SERPL-SCNC: 104 MMOL/L (ref 94–109)
CO2 SERPL-SCNC: 27 MMOL/L (ref 20–32)
CREAT SERPL-MCNC: 0.94 MG/DL (ref 0.66–1.25)
CRP SERPL-MCNC: 61.2 MG/L (ref 0–8)
DIFFERENTIAL METHOD BLD: ABNORMAL
EOSINOPHIL # BLD AUTO: 0.2 10E9/L (ref 0–0.7)
EOSINOPHIL NFR BLD AUTO: 1.6 %
ERYTHROCYTE [DISTWIDTH] IN BLOOD BY AUTOMATED COUNT: 11.2 % (ref 10–15)
GFR SERPL CREATININE-BSD FRML MDRD: 86 ML/MIN/{1.73_M2}
GLUCOSE SERPL-MCNC: 128 MG/DL (ref 70–99)
HCT VFR BLD AUTO: 46.2 % (ref 40–53)
HGB BLD-MCNC: 15.7 G/DL (ref 13.3–17.7)
IMM GRANULOCYTES # BLD: 0.1 10E9/L (ref 0–0.4)
IMM GRANULOCYTES NFR BLD: 0.5 %
INR PPP: 1.12 (ref 0.86–1.14)
LYMPHOCYTES # BLD AUTO: 1.5 10E9/L (ref 0.8–5.3)
LYMPHOCYTES NFR BLD AUTO: 13.3 %
MCH RBC QN AUTO: 30.6 PG (ref 26.5–33)
MCHC RBC AUTO-ENTMCNC: 34 G/DL (ref 31.5–36.5)
MCV RBC AUTO: 90 FL (ref 78–100)
MONOCYTES # BLD AUTO: 1.2 10E9/L (ref 0–1.3)
MONOCYTES NFR BLD AUTO: 10.8 %
NEUTROPHILS # BLD AUTO: 8.1 10E9/L (ref 1.6–8.3)
NEUTROPHILS NFR BLD AUTO: 73.4 %
NRBC # BLD AUTO: 0 10*3/UL
NRBC BLD AUTO-RTO: 0 /100
PLATELET # BLD AUTO: 203 10E9/L (ref 150–450)
POTASSIUM SERPL-SCNC: 3.4 MMOL/L (ref 3.4–5.3)
PROT SERPL-MCNC: 7.3 G/DL (ref 6.8–8.8)
RBC # BLD AUTO: 5.13 10E12/L (ref 4.4–5.9)
SODIUM SERPL-SCNC: 139 MMOL/L (ref 133–144)
TROPONIN I SERPL-MCNC: <0.015 UG/L (ref 0–0.04)
WBC # BLD AUTO: 11.1 10E9/L (ref 4–11)

## 2020-05-20 PROCEDURE — 80053 COMPREHEN METABOLIC PANEL: CPT | Performed by: FAMILY MEDICINE

## 2020-05-20 PROCEDURE — 96361 HYDRATE IV INFUSION ADD-ON: CPT

## 2020-05-20 PROCEDURE — 84484 ASSAY OF TROPONIN QUANT: CPT | Performed by: FAMILY MEDICINE

## 2020-05-20 PROCEDURE — 25000128 H RX IP 250 OP 636: Performed by: FAMILY MEDICINE

## 2020-05-20 PROCEDURE — 25800030 ZZH RX IP 258 OP 636: Performed by: FAMILY MEDICINE

## 2020-05-20 PROCEDURE — 72125 CT NECK SPINE W/O DYE: CPT

## 2020-05-20 PROCEDURE — 96374 THER/PROPH/DIAG INJ IV PUSH: CPT

## 2020-05-20 PROCEDURE — 85025 COMPLETE CBC W/AUTO DIFF WBC: CPT | Performed by: FAMILY MEDICINE

## 2020-05-20 PROCEDURE — 85610 PROTHROMBIN TIME: CPT | Performed by: EMERGENCY MEDICINE

## 2020-05-20 PROCEDURE — 25000125 ZZHC RX 250: Performed by: FAMILY MEDICINE

## 2020-05-20 PROCEDURE — 96375 TX/PRO/DX INJ NEW DRUG ADDON: CPT

## 2020-05-20 PROCEDURE — 99284 EMERGENCY DEPT VISIT MOD MDM: CPT | Mod: Z6 | Performed by: FAMILY MEDICINE

## 2020-05-20 PROCEDURE — 86140 C-REACTIVE PROTEIN: CPT | Performed by: FAMILY MEDICINE

## 2020-05-20 PROCEDURE — 85730 THROMBOPLASTIN TIME PARTIAL: CPT | Performed by: EMERGENCY MEDICINE

## 2020-05-20 PROCEDURE — 25000132 ZZH RX MED GY IP 250 OP 250 PS 637: Performed by: EMERGENCY MEDICINE

## 2020-05-20 PROCEDURE — 71275 CT ANGIOGRAPHY CHEST: CPT

## 2020-05-20 PROCEDURE — 99285 EMERGENCY DEPT VISIT HI MDM: CPT | Mod: 25

## 2020-05-20 RX ORDER — IOPAMIDOL 755 MG/ML
85 INJECTION, SOLUTION INTRAVASCULAR ONCE
Status: COMPLETED | OUTPATIENT
Start: 2020-05-20 | End: 2020-05-20

## 2020-05-20 RX ORDER — OXYCODONE AND ACETAMINOPHEN 5; 325 MG/1; MG/1
1-2 TABLET ORAL EVERY 4 HOURS PRN
Qty: 8 TABLET | Refills: 0 | Status: SHIPPED | OUTPATIENT
Start: 2020-05-20 | End: 2020-06-08

## 2020-05-20 RX ORDER — HYDROMORPHONE HYDROCHLORIDE 1 MG/ML
0.5 INJECTION, SOLUTION INTRAMUSCULAR; INTRAVENOUS; SUBCUTANEOUS ONCE
Status: COMPLETED | OUTPATIENT
Start: 2020-05-20 | End: 2020-05-20

## 2020-05-20 RX ORDER — ONDANSETRON 2 MG/ML
4 INJECTION INTRAMUSCULAR; INTRAVENOUS ONCE
Status: COMPLETED | OUTPATIENT
Start: 2020-05-20 | End: 2020-05-20

## 2020-05-20 RX ORDER — AZITHROMYCIN 250 MG/1
TABLET, FILM COATED ORAL
Qty: 6 TABLET | Refills: 0 | Status: SHIPPED | OUTPATIENT
Start: 2020-05-20 | End: 2020-06-08

## 2020-05-20 RX ADMIN — RIVAROXABAN 15 MG: 15 TABLET, FILM COATED ORAL at 09:22

## 2020-05-20 RX ADMIN — ONDANSETRON 4 MG: 2 INJECTION INTRAMUSCULAR; INTRAVENOUS at 05:58

## 2020-05-20 RX ADMIN — IOPAMIDOL 85 ML: 755 INJECTION, SOLUTION INTRAVENOUS at 06:27

## 2020-05-20 RX ADMIN — HYDROMORPHONE HYDROCHLORIDE 0.5 MG: 1 INJECTION, SOLUTION INTRAMUSCULAR; INTRAVENOUS; SUBCUTANEOUS at 05:58

## 2020-05-20 RX ADMIN — SODIUM CHLORIDE, POTASSIUM CHLORIDE, SODIUM LACTATE AND CALCIUM CHLORIDE 1000 ML: 600; 310; 30; 20 INJECTION, SOLUTION INTRAVENOUS at 05:58

## 2020-05-20 RX ADMIN — SODIUM CHLORIDE 100 ML: 9 INJECTION, SOLUTION INTRAVENOUS at 06:27

## 2020-05-20 NOTE — ED AVS SNAPSHOT
[facility] Emergency Department  [address] [address]  Phone:  [phone]  Fax:  [phone]                                    Cristopher Cuellar   MRN: [id number]    Department:  [facility] Emergency Department   Date of Visit:  5/20/2020           After Visit Summary Signature Page    I have received my discharge instructions, and my questions have been answered. I have discussed any challenges I see with this plan with the nurse or doctor.    ..........................................................................................................................................  Patient/Patient Representative Signature      ..........................................................................................................................................  Patient Representative Print Name and Relationship to Patient    ..................................................               ................................................  Date                                   Time    ..........................................................................................................................................  Reviewed by Signature/Title    ...................................................              ..............................................  Date                                               Time          22EPIC Rev 08/18

## 2020-05-20 NOTE — DISCHARGE INSTRUCTIONS
Return if symptoms worsen or new symptoms develop.  Take Xarelto as directed.  You should take it 15 mg twice a day for 3 weeks and then 20 mg once a day for another 2 months.  You are offered admission but felt he could go home.  Take Percocet for severe pain otherwise take Tylenol.  Do not take ibuprofen or meloxicam with this medication as it could increase bleeding risk.  I am going to send you home with a Z-Trev.  If any fevers cough worsening shortness of breath or other symptoms please begin Zithromax and get rechecked.  At this time you are not coughing and have fever and I do not think you need antibiotics.  If increased chest pain shortness of breath fever or other symptoms present please return for recheck.

## 2020-05-20 NOTE — ED PROVIDER NOTES
Emergency Department Patient Sign-out       Brief HPI:  This is a 59 year old male signed out to me by Dr. Thomson.  See initial ED Provider note for details of the presentation.     Patient was signed out to me for follow-up of CT cervical spine chest PE and abdomen and pelvis CT.  Patient has a 36 to 48-hour history of left-sided neck pain radiating the lateral chest and lateral abdominal wall flank.  Patient denies any recent history of trauma pain is gotten severe.  Pain hurts with deep breathing.  No significant coughing.  CT scan of the cervical spine was unremarkable CT PE protocol abdomen pelvis revealed a left lower lobe PE which was small and possible infiltrate in this area.  I discussed this case with the patient and also with Serge Ferrer hospitalist at [facility].  We offered patient admission for pain control and discussed both Coumadin and newer generation anticoagulants.  Patient would rather use a newer generation anticoagulant risk factors of both had been discussed.  Patient does not feel he needs to be admitted the hospital he is oxygenating well.  The CT showed a possible infiltrate but patient is not having a fever his white count is not significantly elevated and he is not having cough I do not think this is a pneumonia.  I am going to treat the patient with Xarelto and give him a few pain pills.  He understands he needs to follow-up closely with his primary care.  Any signs of bleeding he needs to return for recheck.  If pain is not well controlled he should return for recheck.  He was advised to not take ibuprofen or meloxicam.  I did give him a prescription for a Z-Trev in case he developed any fevers coughing or shortness of breath in which I told him he should start taking this and follow-up immediately for further assessment and care.  Patient is in agreement this plan.            Exam:   Patient Vitals for the past 24 hrs:   BP Temp Temp src Pulse Heart Rate Resp SpO2 Weight    05/20/20 0700 131/68 -- -- -- 65 15 94 % --   05/20/20 0615 120/70 -- -- 74 75 -- 94 % --   05/20/20 0600 120/69 -- -- 74 74 -- 95 % --   05/20/20 0545 136/83 -- -- 81 82 24 96 % --   05/20/20 0530 135/70 -- -- 80 86 13 96 % --   05/20/20 0519 (!) 165/94 98.5  F (36.9  C) Oral -- 92 14 93 % 93 kg (205 lb)           ED RESULTS:   Results for orders placed or performed during the hospital encounter of 05/20/20 (from the past 24 hour(s))   CBC with platelets differential     Status: Abnormal    Collection Time: 05/20/20  5:24 AM   Result Value Ref Range    WBC 11.1 (H) 4.0 - 11.0 10e9/L    RBC Count 5.13 4.4 - 5.9 10e12/L    Hemoglobin 15.7 13.3 - 17.7 g/dL    Hematocrit 46.2 40.0 - 53.0 %    MCV 90 78 - 100 fl    MCH 30.6 26.5 - 33.0 pg    MCHC 34.0 31.5 - 36.5 g/dL    RDW 11.2 10.0 - 15.0 %    Platelet Count 203 150 - 450 10e9/L    Diff Method Automated Method     % Neutrophils 73.4 %    % Lymphocytes 13.3 %    % Monocytes 10.8 %    % Eosinophils 1.6 %    % Basophils 0.4 %    % Immature Granulocytes 0.5 %    Nucleated RBCs 0 0 /100    Absolute Neutrophil 8.1 1.6 - 8.3 10e9/L    Absolute Lymphocytes 1.5 0.8 - 5.3 10e9/L    Absolute Monocytes 1.2 0.0 - 1.3 10e9/L    Absolute Eosinophils 0.2 0.0 - 0.7 10e9/L    Absolute Basophils 0.0 0.0 - 0.2 10e9/L    Abs Immature Granulocytes 0.1 0 - 0.4 10e9/L    Absolute Nucleated RBC 0.0    Comprehensive metabolic panel     Status: Abnormal    Collection Time: 05/20/20  5:24 AM   Result Value Ref Range    Sodium 139 133 - 144 mmol/L    Potassium 3.4 3.4 - 5.3 mmol/L    Chloride 104 94 - 109 mmol/L    Carbon Dioxide 27 20 - 32 mmol/L    Anion Gap 8 3 - 14 mmol/L    Glucose 128 (H) 70 - 99 mg/dL    Urea Nitrogen 17 7 - 30 mg/dL    Creatinine 0.94 0.66 - 1.25 mg/dL    GFR Estimate 86 >60 mL/min/[1.73_m2]    GFR Estimate If Black >90 >60 mL/min/[1.73_m2]    Calcium 8.9 8.5 - 10.1 mg/dL    Bilirubin Total 0.7 0.2 - 1.3 mg/dL    Albumin 3.4 3.4 - 5.0 g/dL    Protein Total 7.3 6.8 -  8.8 g/dL    Alkaline Phosphatase 101 40 - 150 U/L    ALT 26 0 - 70 U/L    AST 16 0 - 45 U/L   Troponin I     Status: None    Collection Time: 05/20/20  5:24 AM   Result Value Ref Range    Troponin I ES <0.015 0.000 - 0.045 ug/L   CRP inflammation     Status: Abnormal    Collection Time: 05/20/20  5:24 AM   Result Value Ref Range    CRP Inflammation 61.2 (H) 0.0 - 8.0 mg/L   CT Chest (PE) Abdomen Pelvis w Contrast     Status: None    Collection Time: 05/20/20  6:54 AM    Narrative    EXAM: CT CHEST PE ABDOMEN PELVIS W CONTRAST  LOCATION: [facility]  DATE/TIME: 5/20/2020 6:23 AM    INDICATION: Left lateral chest and abdominal pain.  COMPARISON: None.  TECHNIQUE: CT angiogram chest and routine CT abdomen pelvis with IV contrast. Arterial phase through the chest and venous phase through the abdomen and pelvis. 2D and 3D MIP reconstructions were preformed by the CT technologist. Dose reduction techniques   were used.   CONTRAST: 85 ml Isovue 370    FINDINGS:  ANGIOGRAM CHEST: Small left lower lobe pulmonary embolism.  Thoracic aorta is not well opacified and is  indeterminate for dissection. No CT evidence of right heart strain.     LUNGS AND PLEURA: Left basilar infiltrate and trace left effusion.    MEDIASTINUM/AXILLAE: No adenopathy or significant pericardial effusion.    HEPATOBILIARY: Subcentimeter hepatic hypodensity small for characterization.    PANCREAS: Normal.    SPLEEN: Normal.    ADRENAL GLANDS: Normal.    KIDNEYS/BLADDER: Normal.    BOWEL: Normal caliber. Normal appendix.    LYMPH NODES: Normal.    PELVIC ORGANS: Normal.    MUSCULOSKELETAL: Degenerative change osseous structures.      Impression    IMPRESSION:  1.  Left lower lobe pulmonary embolism and left basilar infiltrate.  2.  Trace left pleural effusion.  3.  No bowel obstruction or abscess. Appendix normal.    4.  Results given to Dr. Abelardo Han at 0710       ED MEDICATIONS:   Medications   lactated ringers BOLUS 1,000 mL (1,000 mLs  Intravenous New Bag 5/20/20 0558)   HYDROmorphone (PF) (DILAUDID) injection 0.5 mg (0.5 mg Intravenous Given 5/20/20 0558)   ondansetron (ZOFRAN) injection 4 mg (4 mg Intravenous Given 5/20/20 0558)   iopamidol (ISOVUE-370) solution 85 mL (85 mLs Intravenous Given 5/20/20 0627)   sodium chloride 0.9 % bag 500mL for CT scan flush use (100 mLs Intravenous Given 5/20/20 0627)         Impression:    ICD-10-CM    1. Chest pain, unspecified type  R07.9    2. Acute pulmonary embolism without acute cor pulmonale, unspecified pulmonary embolism type (H)  I26.99        Plan:    Patient is discharged home.      MD Guille Leal, Abelardo Johnson MD  05/21/20 0716

## 2020-05-20 NOTE — ED PROVIDER NOTES
History     Chief Complaint   Patient presents with     Shoulder Pain     left neck sholder and side pain x 36 hours     HPI  Cristopher Cuellar is a 59 year old male, past medical history is significant for obesity, presents to the emergency department with concerns of approximately 36-48 hours of left sided neck pain and left lateral chest and left lateral abdominal/flank pain.  The patient could identify no recent injury strain lifting bending falling direct trauma etc.  He is not sure exactly when it started he thinks it awoke him from sleep or that he awoke from it early in the morning a couple of nights ago.  It has gotten increasingly more severe since onset.  He states that the pain is at least an 8-9/10, minimally responsive to acetaminophen the only pain medication tried.  He states that he has a very high pain threshold and would not even come in if the pain were only a 6-7/10.  No associated nausea or vomiting.  The pain is described as dull and achy in both areas of discomfort started around the same time.  He notes that moving his head or neck makes no difference to the neck pain however deep breathing seems to make the left lateral chest and left lateral abdominal pain worse.  He had one episode of diarrhea shortly after symptom onset but has had normal bowel movement since then.  He notes no urinary tract symptoms such as frequency, urgency, dysuria.  He denies fever chills or sweats.  No cough runny nose congestion.  Non-smoker, he states that the only thing that he has been doing is sitting at his computer for the last 8 weeks given the current coronavirus pandemic.    Allergies:  Allergies   Allergen Reactions     Penicillins        Problem List:    Patient Active Problem List    Diagnosis Date Noted     Advanced directives, counseling/discussion 06/02/2016     Priority: Medium     Advance Care Planning 6/2/2016: ACP Review of Chart / Resources Provided:  Reviewed chart for advance care plan.   Cristopher Cuellar has no plan or code status on file. Discussed available resources and provided with information.Added by Shania Levine         CARDIOVASCULAR SCREENING; LDL GOAL LESS THAN 160 07/01/2013     Priority: Medium     Obesity 05/21/2012     Priority: Medium     Problem list name updated by automated process. Provider to review       Health Care Home 04/23/2013     Priority: Low     EMERGENCY CARE PLAN  April 23, 2013: No current Care Coordination follow up planned. Please refer if Care Coordination services are needed.    Presenting Problem Signs and Symptoms Treatment Plan   Questions or concerns   during clinic hours   I will call my clinic directly:  [address] [address]  [phone].    Questions or concerns outside clinic hours   I will call the 24 hour nurse line at   [phone] or [facility].   Need to schedule an appointment   I will call the 24 hour scheduling team at [phone] or my clinic directly at [phone].    Same day treatment     I will call my clinic first, nurse line if after hours, urgent care and express care if needed.   Clinic care coordination services (regular clinic hours)     I will call a clinic care coordinator directly:     Humble Hobson RN  Mon, Tues, Fri - [phone]  Wed, Thurs - [phone]    Rahel Singleton :    [phone]    Or call my clinic at [phone] and ask to speak with care coordination.   Crisis Services: Behavioral or Mental Health  [facility] 24 Hour Phone Line  [phone]    [facility] 24 Hour Crisis Services  [phone]    [facility] (Behavioral Health Providers) Network [phone]    Located within [facility]   [phone]       Emergency treatment -- Immediately    CAll 911             Past Medical History:    Past Medical History:   Diagnosis Date     Allergic state      NO ACTIVE PROBLEMS        Past Surgical History:    Past Surgical History:   Procedure Laterality Date      COLONOSCOPY  3/1/2013    Procedure: COLONOSCOPY;  colonoscopy;  Surgeon: Emani Mckee MD;  Location: [facility]     KNEE SURGERY      Medial meniscus tear, left     NOSE SURGERY      Repair broken nose       Family History:    Family History   Problem Relation Age of Onset     Cancer Father         Lung     Heart Disease Father         MI     Heart Disease Paternal Grandfather         MI       Social History:  Marital Status:   [2]  Social History     Tobacco Use     Smoking status: Former Smoker     Types: Cigars     Last attempt to quit: 2014     Years since quittin.5     Smokeless tobacco: Never Used     Tobacco comment: 1 cigar per day during the summer   Substance Use Topics     Alcohol use: No     Drug use: No        Medications:    azithromycin (ZITHROMAX Z-RJ) 250 MG tablet  oxyCODONE-acetaminophen (PERCOCET) 5-325 MG tablet  rivaroxaban ANTICOAGULANT (XARELTO ANTICOAGULANT) 15 MG TABS tablet  rivaroxaban ANTICOAGULANT (XARELTO ANTICOAGULANT) 20 MG TABS tablet  Ascorbic Acid (VITAMIN C PO)  cetirizine (ZYRTEC) 10 MG tablet  Cholecalciferol (VITAMIN D3) 1000 UNIT TABS  fluticasone (FLONASE) 50 MCG/ACT spray  ibuprofen (ADVIL,MOTRIN) 200 MG tablet  meloxicam 7.5 MG PO tablet          Review of Systems   All other systems reviewed and are negative.      Physical Exam   BP: (!) 165/94  Pulse: 80  Heart Rate: 92  Temp: 98.5  F (36.9  C)  Resp: 14  Weight: 93 kg (205 lb)  SpO2: 93 %      Physical Exam  Vitals signs and nursing note reviewed.   Constitutional:       Appearance: Normal appearance. He is normal weight.      Comments: The patient does not appear uncomfortable or in any acute distress   HENT:      Head: Normocephalic and atraumatic.      Right Ear: Tympanic membrane normal.      Left Ear: Tympanic membrane normal.      Nose: Nose normal.      Mouth/Throat:      Mouth: Mucous membranes are dry.      Pharynx: Oropharynx is clear.   Eyes:      Extraocular Movements: Extraocular  movements intact.      Conjunctiva/sclera: Conjunctivae normal.      Pupils: Pupils are equal, round, and reactive to light.   Neck:      Musculoskeletal: Normal range of motion and neck supple. No neck rigidity or muscular tenderness.   Cardiovascular:      Rate and Rhythm: Normal rate and regular rhythm.      Pulses: Normal pulses.      Heart sounds: Normal heart sounds.   Pulmonary:      Effort: Pulmonary effort is normal.      Breath sounds: Normal breath sounds.   Chest:      Chest wall: Tenderness present.       Abdominal:      General: Bowel sounds are normal.      Palpations: Abdomen is soft.       Musculoskeletal:         General: Tenderness present.   Skin:     General: Skin is warm and dry.   Neurological:      General: No focal deficit present.      Mental Status: He is alert and oriented to person, place, and time.   Psychiatric:         Mood and Affect: Mood normal.         Behavior: Behavior normal.         ED Course        Procedures             Labs Ordered and Resulted from Time of ED Arrival Up to the Time of Departure from the ED   CBC WITH PLATELETS DIFFERENTIAL - Abnormal; Notable for the following components:       Result Value    WBC 11.1 (*)     All other components within normal limits   COMPREHENSIVE METABOLIC PANEL - Abnormal; Notable for the following components:    Glucose 128 (*)     All other components within normal limits   CRP INFLAMMATION - Abnormal; Notable for the following components:    CRP Inflammation 61.2 (*)     All other components within normal limits   TROPONIN I   INR   PARTIAL THROMBOPLASTIN TIME   PERIPHERAL IV CATHETER     Narrative & Impression      EXAM: CT CERVICAL SPINE W/O CONTRAST  LOCATION: [facility]  DATE/TIME: 5/20/2020 6:23 AM     INDICATION: Severe left-sided neck pain without trauma.  COMPARISON: None.  TECHNIQUE: Routine without IV contrast. Multiplanar reformats. Dose reduction techniques were used.     FINDINGS:  VERTEBRA: Normal  vertebral body heights and alignment. No fracture or posttraumatic subluxation. No destructive bony lesion. Mild degenerative changes in the cervical intervertebral discs. Mild to moderate degenerative changes in the cervical facets.      CANAL/FORAMINA: Grossly the central spinal canal in the cervical region is adequate. Moderate left-sided neural foraminal narrowing at the C3-4 level due to bony spurring.     PARASPINAL: No extraspinal abnormality.                                                                      IMPRESSION:  1.  No fracture or posttraumatic subluxation.  2.  Central canal grossly adequate throughout the cervical region. Moderate left-sided neural foraminal narrowing at the C3-4 level due to bony spurring.  3.  Relatively mild degenerative changes in the cervical intervertebral discs with mild to moderate facet arthropathy in the cervical facets.     Narrative & Impression      EXAM: CT CHEST PE ABDOMEN PELVIS W CONTRAST  LOCATION: [facility]  DATE/TIME: 5/20/2020 6:23 AM     INDICATION: Left lateral chest and abdominal pain.  COMPARISON: None.  TECHNIQUE: CT angiogram chest and routine CT abdomen pelvis with IV contrast. Arterial phase through the chest and venous phase through the abdomen and pelvis. 2D and 3D MIP reconstructions were preformed by the CT technologist. Dose reduction techniques   were used.   CONTRAST: 85 ml Isovue 370     FINDINGS:  ANGIOGRAM CHEST: Small left lower lobe pulmonary embolism.  Thoracic aorta is not well opacified and is  indeterminate for dissection. No CT evidence of right heart strain.      LUNGS AND PLEURA: Left basilar infiltrate and trace left effusion.     MEDIASTINUM/AXILLAE: No adenopathy or significant pericardial effusion.     HEPATOBILIARY: Subcentimeter hepatic hypodensity small for characterization.     PANCREAS: Normal.     SPLEEN: Normal.     ADRENAL GLANDS: Normal.     KIDNEYS/BLADDER: Normal.     BOWEL: Normal caliber. Normal  appendix.     LYMPH NODES: Normal.     PELVIC ORGANS: Normal.     MUSCULOSKELETAL: Degenerative change osseous structures.                                                                      IMPRESSION:  1.  Left lower lobe pulmonary embolism and left basilar infiltrate.  2.  Trace left pleural effusion.  3.  No bowel obstruction or abscess. Appendix normal.     4.  Results given to Dr. Abelardo Han at 0710         Critical Care time:  none               No results found for this or any previous visit (from the past 24 hour(s)).    Medications   lactated ringers BOLUS 1,000 mL (0 mLs Intravenous Stopped 5/20/20 0916)   HYDROmorphone (PF) (DILAUDID) injection 0.5 mg (0.5 mg Intravenous Given 5/20/20 0558)   ondansetron (ZOFRAN) injection 4 mg (4 mg Intravenous Given 5/20/20 0558)   iopamidol (ISOVUE-370) solution 85 mL (85 mLs Intravenous Given 5/20/20 0627)   sodium chloride 0.9 % bag 500mL for CT scan flush use (100 mLs Intravenous Given 5/20/20 0627)   rivaroxaban ANTICOAGULANT (XARELTO) tablet 15 mg (15 mg Oral Given 5/20/20 0922)   This patient was signed out at shift change to Dr. Abelardo Han with pending CT cervical spine and CT chest PE abdomen pelvis with contrast.      Assessments & Plan (with Medical Decision Making)     I have reviewed the nursing notes.    I have reviewed the findings, diagnosis, plan and need for follow up with the patient.          Discharge Medication List as of 5/20/2020  9:20 AM      START taking these medications    Details   azithromycin (ZITHROMAX Z-RJ) 250 MG tablet Two tablets on the first day, then one tablet daily for the next 4 days, Disp-6 tablet,R-0, Local Print      oxyCODONE-acetaminophen (PERCOCET) 5-325 MG tablet Take 1-2 tablets by mouth every 4 hours as needed for breakthrough pain, Disp-8 tablet,R-0, Local Print      !! rivaroxaban ANTICOAGULANT (XARELTO ANTICOAGULANT) 15 MG TABS tablet Take 1 tablet (15 mg) by mouth daily (with dinner), Disp-42 tablet,R-0, Local  Print      !! rivaroxaban ANTICOAGULANT (XARELTO ANTICOAGULANT) 20 MG TABS tablet Take 1 tablet (20 mg) by mouth daily (with dinner), Disp-30 tablet,R-0, Local PrintBegin after taking 15 mg BID for three weeks       !! - Potential duplicate medications found. Please discuss with provider.          Final diagnoses:   Chest pain, unspecified type   Acute pulmonary embolism without acute cor pulmonale, unspecified pulmonary embolism type (H)       5/20/2020   [facility] EMERGENCY DEPARTMENT     Serge Thomson MD  05/21/20 6033

## 2020-05-20 NOTE — TELEPHONE ENCOUNTER
Experiencing a lot of pain on left side, mostly his left neck and left rib cage. Feels like he was hit by baseball bat.    Started 36 hours ago, when he was woke up in middle of night by pain. Since then pain has continued to worsen. Last night the patient could not sleep at all. Paced around the house all night and woke up his wife at 4 am to bring him to ED.     Rates pain 8-9/10    Heart is pounding, and he can feel blood pounding in head   -denies fast or slow heartbeat  -denies skipped beats  -states it is just beating VERY hard.     Cannot take deep breaths due to pain    2 days ago had diarrhea    No known injury  No chest pain   No fever  No cough  No nausea/vomiting  No dizziness/lightheadedness  No recent travel  No known exposure to COVID19    Already on their way to [place] ED. Will be there in 10 min. RN feels this is appropriate. They are calling regarding if there is any new policy surrounding the COVID19 pandemic. General COVID19 ED policies and procedures that this RN is aware of were relayed to the patient.   COVID 19 Nurse Triage Plan/Patient Instructions    Please be aware that novel coronavirus (COVID-19) may be circulating in the community. If you develop symptoms such as fever, cough, or SOB or if you have concerns about the presence of another infection including coronavirus (COVID-19), please contact your health care provider or visit [email or web address].     Disposition/Instructions    Patient to go to ED and follow protocol based instructions. Follow System Ambulatory Workflow for COVID 19.     Bring Your Own Device:  Please also bring your smart device(s) (smart phones, tablets, laptops) and their charging cables for your personal use and to communicate with your care team during your visit.    Thank you for limiting contact with others, wearing a simple mask to cover your cough, practice good hand hygiene habits and accessing our virtual services where possible to limit the spread of this  "virus.    For more information about COVID19 and options for caring for yourself at home, please visit the CDC website at https://www.cdc.gov/coronavirus/2019-ncov/about/steps-when-sick.html  For more options for care at [facility], please visit our website at [email or web address]    For more information, please use the [place] Department of Health COVID-19 Website: https://www.[email or web address]./diseases/coronavirus/index.html  [place] Department of Health ([facility]) COVID-19 Hotlines (Interpreters available):      Health questions: Phone Number: [phone] or [phone] and Hours: 7 a.m. to 7 p.m.    Schools and  questions: Phone Number: [phone] or [phone] and Hours 7 a.m. to 7 p.m.    Reason for Disposition    Difficulty breathing or unusual sweating (e.g., sweating without exertion)    Additional Information    Negative: Shock suspected (e.g., cold/pale/clammy skin, too weak to stand, low BP, rapid pulse)    Negative: Difficult to awaken or acting confused (e.g., disoriented, slurred speech)    Negative: [1] Similar pain previously AND [2] it was from \"heart attack\"    Negative: [1] Similar pain previously AND [2] it was from \"angina\" AND [3] not relieved by nitroglycerin    Negative: Sounds like a life-threatening emergency to the triager    Negative: Followed a neck injury (e.g., MVA, sports, impact or collision)    Negative: Chest pain    Negative: Lymph node in the neck is swollen or painful to the touch    Negative: Sore throat is main symptom    Protocols used: NECK PAIN OR [id number]    Pallavi Rodriguez RN on 5/20/2020 at 5:10 AM'  "

## 2020-05-20 NOTE — ED NOTES
Patient unable to urinate at this time. Knows to ring call light for assistance when able to void.

## 2020-05-20 NOTE — ED TRIAGE NOTES
Pain started 36 hours ago waking patient up out of sleep with left neck shoulder and radiating down left side. States can not take deep breath.

## 2020-05-21 ENCOUNTER — TELEPHONE (OUTPATIENT)
Dept: FAMILY MEDICINE | Facility: CLINIC | Age: 60
End: 2020-05-21

## 2020-05-21 NOTE — TELEPHONE ENCOUNTER
"Reason for Call:  Other prescription    Detailed comments: eliquis question.  Ally had [facility] in ED last evening for blood clot.  He was put on eliquis.  They are wondering how \"careful\" he has to be while taking?  They ride horses and motorcycle.  Is he not allowed to do this?  Please call and assess. Thank you..Amy Alfaro    Phone Number Patient can be reached at: Home number on file [phone] (home)    Best Time: any time    Can we leave a detailed message on this number? YES    Call taken on 5/21/2020 at 1:55 PM by Amy Alfaro      "

## 2020-05-26 NOTE — TELEPHONE ENCOUNTER
He was to follow up with primary care I would be happy to do a virtual visit with him. I have personally been on eliquis and can answer questions and do the emergency room follow up at this time. Светлана Patten M.D.

## 2020-06-08 ENCOUNTER — VIRTUAL VISIT (OUTPATIENT)
Dept: FAMILY MEDICINE | Facility: CLINIC | Age: 60
End: 2020-06-08
Payer: COMMERCIAL

## 2020-06-08 DIAGNOSIS — I26.99 ACUTE PULMONARY EMBOLISM WITHOUT ACUTE COR PULMONALE, UNSPECIFIED PULMONARY EMBOLISM TYPE (H): Primary | ICD-10-CM

## 2020-06-08 PROCEDURE — 99214 OFFICE O/P EST MOD 30 MIN: CPT | Mod: 95 | Performed by: FAMILY MEDICINE

## 2020-06-08 ASSESSMENT — PAIN SCALES - GENERAL: PAINLEVEL: NO PAIN (0)

## 2020-06-08 NOTE — PROGRESS NOTES
"Cristopher Cuellar is a 59 year old male who is being evaluated via a billable telephone visit.      The patient has been notified of following:     \"This telephone visit will be conducted via a call between you and your physician/provider. We have found that certain health care needs can be provided without the need for a physical exam.  This service lets us provide the care you need with a short phone conversation.  If a prescription is necessary we can send it directly to your pharmacy.  If lab work is needed we can place an order for that and you can then stop by our lab to have the test done at a later time.    Telephone visits are billed at different rates depending on your insurance coverage. During this emergency period, for some insurers they may be billed the same as an in-person visit.  Please reach out to your insurance provider with any questions.    If during the course of the call the physician/provider feels a telephone visit is not appropriate, you will not be charged for this service.\"    Patient has given verbal consent for Telephone visit?  Yes    What phone number would you like to be contacted at? [phone]    How would you like to obtain your AVS? Chin Evans     Cristopher Cuellar is a 59 year old male who presents via phone visit today for the following health issues:    HPI  ED/UC Followup:    Facility:  [facility]  Date of visit: 05/20/2020  Reason for visit: Chest pain,Pulmonary Embolism  Current Status: He says he is doing much better than he was feeling.           Patient Active Problem List   Diagnosis     Obesity     Health Care Home     CARDIOVASCULAR SCREENING; LDL GOAL LESS THAN 160     Advanced directives, counseling/discussion     Past Surgical History:   Procedure Laterality Date     COLONOSCOPY  3/1/2013    Procedure: COLONOSCOPY;  colonoscopy;  Surgeon: Emani Mckee MD;  Location: [facility]     KNEE SURGERY      Medial meniscus tear, left     " NOSE SURGERY      Repair broken nose       Social History     Tobacco Use     Smoking status: Former Smoker     Types: Cigars     Last attempt to quit: 2014     Years since quittin.6     Smokeless tobacco: Never Used     Tobacco comment: 1 cigar per day during the summer   Substance Use Topics     Alcohol use: No     Family History   Problem Relation Age of Onset     Cancer Father         Lung     Heart Disease Father         MI     Heart Disease Paternal Grandfather         MI         Current Outpatient Medications   Medication Sig Dispense Refill     Ascorbic Acid (VITAMIN C PO) Take 1 tablet by mouth daily       cetirizine (ZYRTEC) 10 MG tablet Take 10 mg by mouth daily as needed.       Cholecalciferol (VITAMIN D3) 1000 UNIT TABS Take 2,000 mg by mouth daily.       fluticasone (FLONASE) 50 MCG/ACT spray Spray 1-2 sprays into both nostrils daily 1 Bottle 11     rivaroxaban ANTICOAGULANT (XARELTO ANTICOAGULANT) 15 MG TABS tablet Take 1 tablet (15 mg) by mouth daily (with dinner) 42 tablet 0     ibuprofen (ADVIL,MOTRIN) 200 MG tablet Take 200 mg by mouth every 4 hours as needed for mild pain       rivaroxaban ANTICOAGULANT (XARELTO ANTICOAGULANT) 20 MG TABS tablet Take 1 tablet (20 mg) by mouth daily (with dinner) (Patient not taking: Reported on 2020) 30 tablet 0     Allergies   Allergen Reactions     Penicillins        Reviewed and updated as needed this visit by Provider         Review of Systems   Constitutional, HEENT, cardiovascular, pulmonary, gi and gu systems are negative, except as otherwise noted.       Objective   Reported vitals:  There were no vitals taken for this visit.   healthy, alert and no distress  PSYCH: Alert and oriented times 3; coherent speech, normal   rate and volume, able to articulate logical thoughts, able   to abstract reason, no tangential thoughts, no hallucinations   or delusions  His affect is normal  RESP: No cough, no audible wheezing, able to talk in full  sentences  Remainder of exam unable to be completed due to telephone visits    Diagnostic Test Results:  Labs reviewed in Epic        Assessment/Plan:  1. Acute pulmonary embolism without acute cor pulmonale, unspecified pulmonary embolism type (H)  Discussed as he has n know risk, but has PE not just a DVT I want hemotologies inout in to how long he should be anticoagulated.  He denies f/h of blood clots   - Oncology/Hematology Adult Referral; Future   discussed importance of staying on anticoagulation.   No follow-ups on file.      Phone call duration:  12 minutes    Zafar Fragoso MD

## 2020-06-28 NOTE — PROGRESS NOTES
The following statement was read to the patient:         This visit will be billed to your insurance the same as an in-person visit. Because of Coronavirus we are instituting telephone visits when possible to keep everyone safe. The telephone visit will be a call between you and the provider.  This service lets us provide the care you need with a telephone conversation.  If a prescription is necessary, we can send it directly to your pharmacy.If lab work or other testing is needed, we can help arrange a place/time for that to be done at a later date.If during the course of the call the provider feels a telephone visit is not appropriate, then your insurance company will not be billed.     The patient agreed to go forward with telephone visit.     [facility] Consult  Telephone visit    Reasons for Consult: - pulmonary embolism    History of Present Illness: Mr. Bran is a 59 year old man diagnosed with left lower lobe PE on 5/20/20. He presented with a 1-2 day h/o left neck and chest pain, and pain with deep inspiration. The pain woke him from sleep. No fevers, chills or cough. 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 He would sit on tall chair at the kitchen counter and would wrap his legs around the chair. He would often sit for 4 hours at a time without getting up.He recalls his R calf being a bit sore for a couple of days prior to the diagnosis of PE.   He  was started on rivaroxaban. Also started on a Z-derik because of LLL infiltrate. He was not tested for COVID19.     He reports that he started feeling better after just a few days of treatment. He is back to his usual activity, denies dyspnea. No epistaxis, gum bleeding, melena, hematochezia, hematuria or large bruises while on the rivaroxaban. No leg pain or swelling.     Past Medical History:  -obesity  -hypercholesterolemia  -s/p L knee surgery  -s/p nasal surgery- broken nose  -degenerative  c-spine disease  -h/o carpal tunnel release 19      Medications:  -vitaminC  cetirazine  Vitamine D  Flonase  ibuprfen prn headache  rivaroxaban 20 mg daily.     Family History: mother- of COPD- smoker  , father-lung cancer, MI  , siblings -3 sisters and a brother- healthy as far as he knows. No DVT/PE in the family.     Social History: smoking-former, quit , alcohol-no, , works for [employer]    Review of systems:  As in HPI.  Chronic neck pain, Occasional heartburn-uses tums. Normal PSA earlier this year. Colonoscopy normal in . The rest of the > 10 point review of systems was negative.        Labs:  Results for ROMAINE NICHOLS (MRN [id number]) as of 2020 16:28   Ref. Range 2020 05:24   Sodium Latest Ref Range: 133 - 144 mmol/L 139   Potassium Latest Ref Range: 3.4 - 5.3 mmol/L 3.4   Chloride Latest Ref Range: 94 - 109 mmol/L 104   Carbon Dioxide Latest Ref Range: 20 - 32 mmol/L 27   Urea Nitrogen Latest Ref Range: 7 - 30 mg/dL 17   Creatinine Latest Ref Range: 0.66 - 1.25 mg/dL 0.94   GFR Estimate Latest Ref Range: >60 mL/min/1.73_m2 86   GFR Estimate If Black Latest Ref Range: >60 mL/min/1.73_m2 >90   Calcium Latest Ref Range: 8.5 - 10.1 mg/dL 8.9   Anion Gap Latest Ref Range: 3 - 14 mmol/L 8   Albumin Latest Ref Range: 3.4 - 5.0 g/dL 3.4   Protein Total Latest Ref Range: 6.8 - 8.8 g/dL 7.3   Bilirubin Total Latest Ref Range: 0.2 - 1.3 mg/dL 0.7   Alkaline Phosphatase Latest Ref Range: 40 - 150 U/L 101   ALT Latest Ref Range: 0 - 70 U/L 26   AST Latest Ref Range: 0 - 45 U/L 16   CRP Inflammation Latest Ref Range: 0.0 - 8.0 mg/L 61.2 (H)   Troponin I ES Latest Ref Range: 0.000 - 0.045 ug/L <0.015   Glucose Latest Ref Range: 70 - 99 mg/dL 128 (H)   WBC Latest Ref Range: 4.0 - 11.0 10e9/L 11.1 (H)   Hemoglobin Latest Ref Range: 13.3 - 17.7 g/dL 15.7   Hematocrit Latest Ref Range: 40.0 - 53.0 % 46.2   Platelet Count Latest Ref Range: 150 - 450 10e9/L 203   RBC Count Latest Ref  Range: 4.4 - 5.9 10e12/L 5.13   MCV Latest Ref Range: 78 - 100 fl 90   MCH Latest Ref Range: 26.5 - 33.0 pg 30.6   MCHC Latest Ref Range: 31.5 - 36.5 g/dL 34.0   RDW Latest Ref Range: 10.0 - 15.0 % 11.2   Diff Method Unknown Automated Method   % Neutrophils Latest Units: % 73.4   % Lymphocytes Latest Units: % 13.3   % Monocytes Latest Units: % 10.8   % Eosinophils Latest Units: % 1.6   % Basophils Latest Units: % 0.4   % Immature Granulocytes Latest Units: % 0.5   Nucleated RBCs Latest Ref Range: 0 /100 0   Absolute Neutrophil Latest Ref Range: 1.6 - 8.3 10e9/L 8.1   Absolute Lymphocytes Latest Ref Range: 0.8 - 5.3 10e9/L 1.5   Absolute Monocytes Latest Ref Range: 0.0 - 1.3 10e9/L 1.2   Absolute Eosinophils Latest Ref Range: 0.0 - 0.7 10e9/L 0.2   Absolute Basophils Latest Ref Range: 0.0 - 0.2 10e9/L 0.0   Abs Immature Granulocytes Latest Ref Range: 0 - 0.4 10e9/L 0.1   Absolute Nucleated RBC Unknown 0.0   INR Latest Ref Range: 0.86 - 1.14  1.12   PTT Latest Ref Range: 22 - 37 sec 37     Imaging:   EXAM: CT CHEST PE ABDOMEN PELVIS W CONTRAST  LOCATION: [facility]  DATE/TIME: 5/20/2020 6:23 AM     INDICATION: Left lateral chest and abdominal pain.  COMPARISON: None.  TECHNIQUE: CT angiogram chest and routine CT abdomen pelvis with IV contrast. Arterial phase through the chest and venous phase through the abdomen and pelvis. 2D and 3D MIP reconstructions were preformed by the CT technologist. Dose reduction techniques   were used.   CONTRAST: 85 ml Isovue 370     FINDINGS:  ANGIOGRAM CHEST: Small left lower lobe pulmonary embolism.  Thoracic aorta is not well opacified and is  indeterminate for dissection. No CT evidence of right heart strain.      LUNGS AND PLEURA: Left basilar infiltrate and trace left effusion.     MEDIASTINUM/AXILLAE: No adenopathy or significant pericardial effusion.     HEPATOBILIARY: Subcentimeter hepatic hypodensity small for characterization.     PANCREAS: Normal.     SPLEEN:  "Normal.     ADRENAL GLANDS: Normal.     KIDNEYS/BLADDER: Normal.     BOWEL: Normal caliber. Normal appendix.     LYMPH NODES: Normal.     PELVIC ORGANS: Normal.     MUSCULOSKELETAL: Degenerative change osseous structures.                                                                      IMPRESSION:  1.  Left lower lobe pulmonary embolism and left basilar infiltrate.  2.  Trace left pleural effusion.  3.  No bowel obstruction or abscess. Appendix normal.     4.  Results given to Dr. Abelardo Han at 0710    Assessment and Recommendation: -    Pulmonary embolism, possible provoked by sitting on high chair for prolonged periods of time. His situation is in a \"gray area\", in which it is not clear if theh PE should be declared \"provoked\" vs \"unprovoked\". Will check labs related to anti-phospholipid syndrome, since this would . Cannot check lupus anticoagulant while on rivaroxaban, but can check cardiolipins and beta-2 glycoprotein 1antibodies. No reason to check for inherited thrombophilias, since no family ho VTE.    No need to repeat chest CT if he is feeling well and back to all usual activities.     Will continue the anticoagulation for 6 months, the decide based on d-dimer when off anticoagulation for 2 weeks as to whether he should go back on it or stop.    Discussed s/s of DVT/PE. Also discussed that risk for COVID not really changed much, keep practicing social distancing and wearing a mask, especially when going to indoor places such as grocery store. Counseled on risk, s/s of bleeding. .     -Check labs at [facility] within the next week.   -Continue on Xarelto until ~ November 20, 2020, then stop.  -Check labs (d-dimer) on ~ 12/3/20  -RTC 6 months- after the d-dimer results    I will be in touch about these lab results.   Contact us by phone [phone] or by My Chart if have questions    Telephone time  Start: 11:57  End 12:34 pm  Telephone time 37 minutes    Mamta Cardenas, " MD  Hematology

## 2020-06-29 ENCOUNTER — VIRTUAL VISIT (OUTPATIENT)
Dept: HEMATOLOGY | Facility: CLINIC | Age: 60
End: 2020-06-29
Attending: INTERNAL MEDICINE
Payer: COMMERCIAL

## 2020-06-29 DIAGNOSIS — I26.99 ACUTE PULMONARY EMBOLISM WITHOUT ACUTE COR PULMONALE, UNSPECIFIED PULMONARY EMBOLISM TYPE (H): ICD-10-CM

## 2020-06-29 PROCEDURE — 99203 OFFICE O/P NEW LOW 30 MIN: CPT | Mod: 95 | Performed by: INTERNAL MEDICINE

## 2020-06-29 PROCEDURE — 40000809 ZZH STATISTIC NO DOCUMENTATION TO SUPPORT CHARGE

## 2020-06-29 NOTE — PATIENT INSTRUCTIONS
Pulmonary embolism, possible provoked by sitting on high chair for prolonged periods of time. Will check labs related to anti-phospholipid syndrome.  -Check labs at [facility] within the next week.   -Continue on Xarelto until ~ November 20, 2020, then stop.  -Check labs (d-dimer) on ~ 12/3/20    I will be in touch about these lab results.   Contact us by phone [phone] or by My Chart if you have questions    Mamta Cardenas MD  [facility].

## 2020-07-06 ENCOUNTER — MYC REFILL (OUTPATIENT)
Dept: FAMILY MEDICINE | Facility: CLINIC | Age: 60
End: 2020-07-06

## 2020-07-06 DIAGNOSIS — I26.99 ACUTE PULMONARY EMBOLISM WITHOUT ACUTE COR PULMONALE, UNSPECIFIED PULMONARY EMBOLISM TYPE (H): Primary | ICD-10-CM

## 2020-07-13 DIAGNOSIS — I26.99 ACUTE PULMONARY EMBOLISM WITHOUT ACUTE COR PULMONALE, UNSPECIFIED PULMONARY EMBOLISM TYPE (H): ICD-10-CM

## 2020-07-13 PROCEDURE — 86147 CARDIOLIPIN ANTIBODY EA IG: CPT | Performed by: INTERNAL MEDICINE

## 2020-07-13 PROCEDURE — 36415 COLL VENOUS BLD VENIPUNCTURE: CPT | Performed by: INTERNAL MEDICINE

## 2020-07-13 PROCEDURE — 86146 BETA-2 GLYCOPROTEIN ANTIBODY: CPT | Performed by: INTERNAL MEDICINE

## 2020-07-14 LAB
B2 GLYCOPROT1 IGG SERPL IA-ACNC: 0.9 U/ML
B2 GLYCOPROT1 IGM SERPL IA-ACNC: <2.9 U/ML
CARDIOLIPIN ANTIBODY IGG: <1.6 GPL-U/ML (ref 0–19.9)
CARDIOLIPIN ANTIBODY IGM: 2.5 MPL-U/ML (ref 0–19.9)

## 2020-08-03 DIAGNOSIS — I26.99 ACUTE PULMONARY EMBOLISM WITHOUT ACUTE COR PULMONALE, UNSPECIFIED PULMONARY EMBOLISM TYPE (H): ICD-10-CM

## 2020-08-03 RX ORDER — RIVAROXABAN 20 MG/1
TABLET, FILM COATED ORAL
Qty: 30 TABLET | Refills: 0 | Status: SHIPPED | OUTPATIENT
Start: 2020-08-03 | End: 2020-09-03

## 2020-08-03 NOTE — TELEPHONE ENCOUNTER
Routing refill request to provider for review/approval because:  Would like Provider to decide.  Thank you.  Dewey Murray RN

## 2020-09-03 ENCOUNTER — VIRTUAL VISIT (OUTPATIENT)
Dept: FAMILY MEDICINE | Facility: CLINIC | Age: 60
End: 2020-09-03
Payer: COMMERCIAL

## 2020-09-03 DIAGNOSIS — I26.99 ACUTE PULMONARY EMBOLISM WITHOUT ACUTE COR PULMONALE, UNSPECIFIED PULMONARY EMBOLISM TYPE (H): ICD-10-CM

## 2020-09-03 DIAGNOSIS — R53.83 FATIGUE, UNSPECIFIED TYPE: Primary | ICD-10-CM

## 2020-09-03 PROCEDURE — 99213 OFFICE O/P EST LOW 20 MIN: CPT | Mod: 95 | Performed by: FAMILY MEDICINE

## 2020-09-03 NOTE — PROGRESS NOTES
"Cristopher Cuellar is a 59 year old male who is being evaluated via a billable telephone visit.      The patient has been notified of following:     \"This telephone visit will be conducted via a call between you and your physician/provider. We have found that certain health care needs can be provided without the need for a physical exam.  This service lets us provide the care you need with a short phone conversation.  If a prescription is necessary we can send it directly to your pharmacy.  If lab work is needed we can place an order for that and you can then stop by our lab to have the test done at a later time.    Telephone visits are billed at different rates depending on your insurance coverage. During this emergency period, for some insurers they may be billed the same as an in-person visit.  Please reach out to your insurance provider with any questions.    If during the course of the call the physician/provider feels a telephone visit is not appropriate, you will not be charged for this service.\"    Patient has given verbal consent for Telephone visit?  Yes    What phone number would you like to be contacted at? [phone]    How would you like to obtain your AVS? Ellishart    Subjective     Cristopher Cuellar is a 59 year old male who presents via phone visit today for the following health issues:    HPI    Medication Followup of Xarelto    Taking Medication as prescribed: yes    Side Effects:  None    Medication Helping Symptoms:  Yes but patient has noticed that since PE he has experienced dizziness and lack of stamina        Review of Systems   Constitutional, HEENT, cardiovascular, pulmonary, gi and gu systems are negative, except as otherwise noted.       Objective          Vitals:  No vitals were obtained today due to virtual visit.    healthy, alert and no distress  PSYCH: Alert and oriented times 3; coherent speech, normal   rate and volume, able to articulate logical thoughts, able   to abstract reason, " "no tangential thoughts, no hallucinations   or delusions  His affect is normal  RESP: No cough, no audible wheezing, able to talk in full sentences  Remainder of exam unable to be completed due to telephone visits          Assessment/Plan:  (R53.83) Fatigue, unspecified type  (primary encounter diagnosis)  Comment: likely decrease pulm researve from PE. howeer as treatment would be increase exerrcise I want him to see me in appointment have eklg and most likely have a stress testt.   Plan: CBC with platelets and differential,         Comprehensive metabolic panel (BMP + Alb, Alk         Phos, ALT, AST, Total. Bili, TP)            (I26.99) Acute pulmonary embolism without acute cor pulmonale, unspecified pulmonary embolism type (H)  Comm  Continue anticoag until nov 20th  Plan: rivaroxaban ANTICOAGULANT (XARELTO         ANTICOAGULANT) 20 MG TABS tablet                 BMI:   Estimated body mass index is 29.63 kg/m  as calculated from the following:    Height as of 2/19/20: 1.772 m (5' 9.75\").    Weight as of 5/20/20: 93 kg (205 lb).   Weight management plan: Discussed healthy diet and exercise guidelines            No follow-ups on file.    Zafar Fragoso MD  [facility]    Phone call duration:  14 minutes                "

## 2020-09-11 DIAGNOSIS — I26.99 ACUTE PULMONARY EMBOLISM WITHOUT ACUTE COR PULMONALE, UNSPECIFIED PULMONARY EMBOLISM TYPE (H): ICD-10-CM

## 2020-09-11 DIAGNOSIS — R53.83 FATIGUE, UNSPECIFIED TYPE: ICD-10-CM

## 2020-09-11 LAB
ALBUMIN SERPL-MCNC: 3.7 G/DL (ref 3.4–5)
ALP SERPL-CCNC: 111 U/L (ref 40–150)
ALT SERPL W P-5'-P-CCNC: 37 U/L (ref 0–70)
ANION GAP SERPL CALCULATED.3IONS-SCNC: <1 MMOL/L (ref 3–14)
AST SERPL W P-5'-P-CCNC: 26 U/L (ref 0–45)
BASOPHILS # BLD AUTO: 0 10E9/L (ref 0–0.2)
BASOPHILS NFR BLD AUTO: 0.6 %
BILIRUB SERPL-MCNC: 0.6 MG/DL (ref 0.2–1.3)
BUN SERPL-MCNC: 14 MG/DL (ref 7–30)
CALCIUM SERPL-MCNC: 9.3 MG/DL (ref 8.5–10.1)
CHLORIDE SERPL-SCNC: 102 MMOL/L (ref 94–109)
CO2 SERPL-SCNC: 33 MMOL/L (ref 20–32)
CREAT SERPL-MCNC: 1.09 MG/DL (ref 0.66–1.25)
D DIMER PPP FEU-MCNC: 0.4 UG/ML FEU (ref 0–0.5)
DIFFERENTIAL METHOD BLD: NORMAL
EOSINOPHIL # BLD AUTO: 0.2 10E9/L (ref 0–0.7)
EOSINOPHIL NFR BLD AUTO: 3.2 %
ERYTHROCYTE [DISTWIDTH] IN BLOOD BY AUTOMATED COUNT: 11.9 % (ref 10–15)
GFR SERPL CREATININE-BSD FRML MDRD: 72 ML/MIN/{1.73_M2}
GLUCOSE SERPL-MCNC: 96 MG/DL (ref 70–99)
HCT VFR BLD AUTO: 50.1 % (ref 40–53)
HGB BLD-MCNC: 17.1 G/DL (ref 13.3–17.7)
LYMPHOCYTES # BLD AUTO: 1.8 10E9/L (ref 0.8–5.3)
LYMPHOCYTES NFR BLD AUTO: 29.7 %
MCH RBC QN AUTO: 30.8 PG (ref 26.5–33)
MCHC RBC AUTO-ENTMCNC: 34.1 G/DL (ref 31.5–36.5)
MCV RBC AUTO: 90 FL (ref 78–100)
MONOCYTES # BLD AUTO: 0.8 10E9/L (ref 0–1.3)
MONOCYTES NFR BLD AUTO: 12.9 %
NEUTROPHILS # BLD AUTO: 3.3 10E9/L (ref 1.6–8.3)
NEUTROPHILS NFR BLD AUTO: 53.6 %
PLATELET # BLD AUTO: 225 10E9/L (ref 150–450)
POTASSIUM SERPL-SCNC: 4 MMOL/L (ref 3.4–5.3)
PROT SERPL-MCNC: 7.7 G/DL (ref 6.8–8.8)
RBC # BLD AUTO: 5.55 10E12/L (ref 4.4–5.9)
SODIUM SERPL-SCNC: 135 MMOL/L (ref 133–144)
WBC # BLD AUTO: 6.2 10E9/L (ref 4–11)

## 2020-09-11 PROCEDURE — 36415 COLL VENOUS BLD VENIPUNCTURE: CPT | Performed by: FAMILY MEDICINE

## 2020-09-11 PROCEDURE — 85379 FIBRIN DEGRADATION QUANT: CPT | Performed by: INTERNAL MEDICINE

## 2020-09-11 PROCEDURE — 80053 COMPREHEN METABOLIC PANEL: CPT | Performed by: FAMILY MEDICINE

## 2020-09-11 PROCEDURE — 85025 COMPLETE CBC W/AUTO DIFF WBC: CPT | Performed by: FAMILY MEDICINE

## 2020-09-18 DIAGNOSIS — Z86.711 HISTORY OF PULMONARY EMBOLISM: Primary | ICD-10-CM

## 2020-09-28 ENCOUNTER — OFFICE VISIT (OUTPATIENT)
Dept: FAMILY MEDICINE | Facility: CLINIC | Age: 60
End: 2020-09-28
Payer: COMMERCIAL

## 2020-09-28 VITALS
DIASTOLIC BLOOD PRESSURE: 77 MMHG | BODY MASS INDEX: 29.92 KG/M2 | WEIGHT: 209 LBS | TEMPERATURE: 97.7 F | SYSTOLIC BLOOD PRESSURE: 119 MMHG | RESPIRATION RATE: 18 BRPM | HEART RATE: 74 BPM | HEIGHT: 70 IN

## 2020-09-28 DIAGNOSIS — R53.83 FATIGUE, UNSPECIFIED TYPE: ICD-10-CM

## 2020-09-28 DIAGNOSIS — Z23 NEED FOR PROPHYLACTIC VACCINATION AND INOCULATION AGAINST INFLUENZA: Primary | ICD-10-CM

## 2020-09-28 PROCEDURE — 93000 ELECTROCARDIOGRAM COMPLETE: CPT | Performed by: FAMILY MEDICINE

## 2020-09-28 PROCEDURE — 90471 IMMUNIZATION ADMIN: CPT | Performed by: FAMILY MEDICINE

## 2020-09-28 PROCEDURE — 90682 RIV4 VACC RECOMBINANT DNA IM: CPT | Performed by: FAMILY MEDICINE

## 2020-09-28 PROCEDURE — 99213 OFFICE O/P EST LOW 20 MIN: CPT | Mod: 25 | Performed by: FAMILY MEDICINE

## 2020-09-28 ASSESSMENT — PAIN SCALES - GENERAL: PAINLEVEL: NO PAIN (0)

## 2020-09-28 ASSESSMENT — MIFFLIN-ST. JEOR: SCORE: 1759.27

## 2020-09-28 NOTE — PROGRESS NOTES
SUBJECTIVE:                                                    Cristopher Cuellar is a 59 year old male who presents to clinic today for the following health issues:    Chief Complaint   Patient presents with     RECHECK     on recent lab results.      Imm/Inj     Flu Shot     -His most recent labs that he is here to follow up on were done on 09/11/2020.    -He is also wanting to get a flu shot.    -Wanting to discuss some continued foot cramping. Wanting shoe inserts to put in some work boots.     Component      Latest Ref Rng & Units 9/11/2020   WBC      4.0 - 11.0 10e9/L 6.2   RBC Count      4.4 - 5.9 10e12/L 5.55   Hemoglobin      13.3 - 17.7 g/dL 17.1   Hematocrit      40.0 - 53.0 % 50.1   MCV      78 - 100 fl 90   MCH      26.5 - 33.0 pg 30.8   MCHC      31.5 - 36.5 g/dL 34.1   RDW      10.0 - 15.0 % 11.9   Platelet Count      150 - 450 10e9/L 225   % Neutrophils      % 53.6   % Lymphocytes      % 29.7   % Monocytes      % 12.9   % Eosinophils      % 3.2   % Basophils      % 0.6   Absolute Neutrophil      1.6 - 8.3 10e9/L 3.3   Absolute Lymphocytes      0.8 - 5.3 10e9/L 1.8   Absolute Monocytes      0.0 - 1.3 10e9/L 0.8   Absolute Eosinophils      0.0 - 0.7 10e9/L 0.2   Absolute Basophils      0.0 - 0.2 10e9/L 0.0   Diff Method       Automated Method   Sodium      133 - 144 mmol/L 135   Potassium      3.4 - 5.3 mmol/L 4.0   Chloride      94 - 109 mmol/L 102   Carbon Dioxide      20 - 32 mmol/L 33 (H)   Anion Gap      3 - 14 mmol/L <1 (L)   Glucose      70 - 99 mg/dL 96   Urea Nitrogen      7 - 30 mg/dL 14   Creatinine      0.66 - 1.25 mg/dL 1.09   GFR Estimate      >60 mL/min/1.73:m2 72   GFR Estimate If Black      >60 mL/min/1.73:m2 84   Calcium      8.5 - 10.1 mg/dL 9.3   Bilirubin Total      0.2 - 1.3 mg/dL 0.6   Albumin      3.4 - 5.0 g/dL 3.7   Protein Total      6.8 - 8.8 g/dL 7.7   Alkaline Phosphatase      40 - 150 U/L 111   ALT      0 - 70 U/L 37   AST      0 - 45 U/L 26   Cardiolipin Antibody IgG       0.0 - 19.9 GPL-U/mL    Cardiolipin Antibody IgM      0.0 - 19.9 MPL-U/mL    Beta 2 Glycoprotein 1 Antibody IgM      <7 U/mL    Beta 2 Glycoprotein 1 Antibody IgG      <7 U/mL      Problem list and histories reviewed & adjusted, as indicated.  Additional history:     Patient Active Problem List   Diagnosis     Obesity     Health Care Home     CARDIOVASCULAR SCREENING; LDL GOAL LESS THAN 160     Advanced directives, counseling/discussion     Acute pulmonary embolism without acute cor pulmonale, unspecified pulmonary embolism type (H)     Past Surgical History:   Procedure Laterality Date     COLONOSCOPY  3/1/2013    Procedure: COLONOSCOPY;  colonoscopy;  Surgeon: Emani Mckee MD;  Location: [facility]     KNEE SURGERY      Medial meniscus tear, left     NOSE SURGERY      Repair broken nose       Social History     Tobacco Use     Smoking status: Former Smoker     Types: Cigars     Last attempt to quit: 2014     Years since quittin.9     Smokeless tobacco: Never Used     Tobacco comment: 1 cigar per day during the summer   Substance Use Topics     Alcohol use: No     Family History   Problem Relation Age of Onset     Cancer Father         Lung     Heart Disease Father         MI     Heart Disease Paternal Grandfather         MI         Current Outpatient Medications   Medication Sig Dispense Refill     Ascorbic Acid (VITAMIN C PO) Take 1 tablet by mouth daily       cetirizine (ZYRTEC) 10 MG tablet Take 10 mg by mouth daily as needed.       Cholecalciferol (VITAMIN D3) 1000 UNIT TABS Take 2,000 mg by mouth daily.       doxylamine (UNISOM) 25 MG TABS tablet Take 12.5 mg by mouth At Bedtime       fluticasone (FLONASE) 50 MCG/ACT spray Spray 1-2 sprays into both nostrils daily 1 Bottle 11     rivaroxaban ANTICOAGULANT (XARELTO ANTICOAGULANT) 20 MG TABS tablet Take 1 tablet (20 mg) by mouth daily (with dinner) 90 tablet 0     Allergies   Allergen Reactions     Penicillins        ROS:  Constitutional,  "HEENT, cardiovascular, pulmonary, gi and gu systems are negative, except as otherwise noted.    OBJECTIVE:                                                    /77   Pulse 74   Temp 97.7  F (36.5  C) (Tympanic)   Resp 18   Ht 1.778 m (5' 10\")   Wt 94.8 kg (209 lb)   BMI 29.99 kg/m   Body mass index is 29.99 kg/m .   GENERAL: healthy, alert, well nourished, well hydrated, no distress  HENT: ear canals- normal; TMs- normal; Nose- normal; Mouth- no ulcers, no lesions  NECK: no tenderness, no adenopathy, no asymmetry, no masses, no stiffness; thyroid- normal to palpation  RESP: lungs clear to auscultation - no rales, no rhonchi, no wheezes  CV: regular rates and rhythm, normal S1 S2, no S3 or S4 and no murmur, no click or rub -  ABDOMEN: soft, no tenderness, no  hepatosplenomegaly, no masses, normal bowel sounds       ASSESSMENT/PLAN:                                                      (Z23) Need for prophylactic vaccination and inoculation against influenza  (primary encounter diagnosis)  Plan: INFLUENZA QUAD, RECOMBINANT, P-FREE (RIV4)         (FLUBLOCK) [89297], Vaccine Administration,         Initial [58939]        (R53.83) Fatigue, unspecified type  Most likely from pe damage to lung.   Plan: EKG 12-lead complete w/read - Clinics       reports that he quit smoking about 5 years ago. His smoking use included cigars. He has never used smokeless tobacco.      Weight management plan: Discussed healthy diet and exercise guidelines      [facility]    "

## 2020-11-30 ENCOUNTER — VIRTUAL VISIT (OUTPATIENT)
Dept: HEMATOLOGY | Facility: CLINIC | Age: 60
End: 2020-11-30
Attending: PHYSICIAN ASSISTANT
Payer: COMMERCIAL

## 2020-11-30 ENCOUNTER — CONSENT FORM (OUTPATIENT)
Dept: HEMATOLOGY | Facility: CLINIC | Age: 60
End: 2020-11-30

## 2020-11-30 DIAGNOSIS — Z86.711 HISTORY OF PULMONARY EMBOLISM: ICD-10-CM

## 2020-11-30 DIAGNOSIS — Z86.711 HISTORY OF PULMONARY EMBOLISM: Primary | ICD-10-CM

## 2020-11-30 LAB — D DIMER PPP FEU-MCNC: 0.4 UG/ML FEU (ref 0–0.5)

## 2020-11-30 PROCEDURE — 85379 FIBRIN DEGRADATION QUANT: CPT | Performed by: INTERNAL MEDICINE

## 2020-11-30 PROCEDURE — 99214 OFFICE O/P EST MOD 30 MIN: CPT | Mod: 95 | Performed by: PHYSICIAN ASSISTANT

## 2020-11-30 NOTE — PROGRESS NOTES
[facility]  [facility]  [facility], [place] [address]  Main: [phone], Fax: [phone]      Patient: Cristopher Cuellar  MRN: [id number]  : 1960  CHERI: 2020      History of Present Illness: Humble is a 60 year old man diagnosed with left lower lobe PE on 20. He presented with a 1-2 day h/o left neck and chest pain, and pain with deep inspiration. The pain woke him from sleep. No fevers, chills or cough. 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 He would sit on tall chair at the kitchen counter and would wrap his legs around the chair. He would often sit for 4 hours at a time without getting up.  He recalls his R calf being a bit sore for a couple of days prior to the diagnosis of PE.   He  was started on rivaroxaban. Also started on a Z-derik because of LLL infiltrate. He was not tested for COVID19.      He reports that he started feeling better after just a few days of treatment. He is back to his usual activity, denies dyspnea. No epistaxis, gum bleeding, melena, hematochezia, hematuria or large bruises while on the rivaroxaban. No leg pain or swelling.     He is here to talk to about discontinuing anticoagulation.  He had a repeat D-Dimer today which was negative (0.4) after 8 days off Xarelto.     Past Medical History:  -obesity  -hypercholesterolemia  -s/p L knee surgery  -s/p nasal surgery- broken nose  -degenerative c-spine disease  -h/o carpal tunnel release 19        Medications:  -vitaminC  cetirazine  Vitamine D  Flonase  ibuprofen prn headache  rivaroxaban 20 mg daily.      Family History: mother- of COPD- smoker  , father-lung cancer, MI  , siblings -3 sisters and a brother- healthy as far as he knows. No DVT/PE in the family.      Social History: smoking-former, quit , alcohol-no, , works for [employer]     Review of systems:  States he has been  "feeling well, trying to get more exercise - mainly hiking. Denies any bleeding issues. Denies any ecchymosis. Denies any lower extremity swelling or pain.  No chest pain or any shortness of breath.     Objective:  No exam done today as this was done a video visit    Labs:  Results for HUMBLE NICHOLS (MRN [id number]) as of 11/30/2020 16:15   Ref. Range 7/13/2020 15:24 9/11/2020 10:32 11/30/2020 09:55   D-Dimer Latest Ref Range: 0.0 - 0.50 ug/ml FEU  0.4 0.4   Cardiolipin Antibody IgG Latest Ref Range: 0.0 - 19.9 GPL-U/mL <1.6     Cardiolipin Antibody IgM Latest Ref Range: 0.0 - 19.9 MPL-U/mL 2.5     Beta 2 Glycoprotein 1 Antibody IgG Latest Ref Range: <7 U/mL 0.9     Beta 2 Glycoprotein 1 Antibody IgM Latest Ref Range: <7 U/mL <2.9       Assessment/Plan:  Humble is a 59 yo man with history of pulmonary embolism 6 months ago who has been on Xarelto since the event.  He is here today to discuss discontinuation of anticoagulation.   His event was minimally provoked by sitting on high chair for prolonged periods of time. His situation is in a \"gray area\", in which it is not clear if the PE should be declared \"provoked\" vs \"unprovoked\".  He labs were checked related to anti-phospholipid syndrome which were negative.  Repeat D-dimer off anticoagulation (only 8 days off) was negative.  No reason to check for inherited thrombophilias, since no family ho VTE.     No need to repeat chest CT if he is feeling well and back to all usual activities.     Humble would like to strongly consider discontinuation of anticoagulation.  We discussed remaining off anticoagulation verses restarting and remaining on longer term anticoagulation for prevention of thrombosis.  He understands that we cannot predict if he will have a recurrent event.  He will discuss with his wife and call us if he wants to go back on Xarelto. Could consider Xarelto 10mg daily if decided to remain on longer term anticoagulation.     Reviewed times of increased clotting " risk in the future in the event he stays off anticoagulation.  He may need preventative anticoagulation for surgeries, hospitalizations or prolonged travel. He knows to contact us in these events.         Total Time Spent:  25 minutes, all 25 minutes was spent on face-to-face consultation of the patient and coordination of care in regard to history of PE and need for longer term anticoagulation.            Jyoti Brown MPH, PA-C  [facility]  [facility]  [phone] main line  [phone] pager  [phone] fax

## 2020-11-30 NOTE — NURSING NOTE
Patient was contacted to complete the pre-visit call prior to their video visit with the provider.  The following statement was read:       This visit will be billed to your insurance the same as an in-person visit. Because of Coronavirus we are instituting video visits when possible to keep everyone safe. This video visit will be conducted between you and the provider.  This service lets us provide the care you need with a video conversation.  If a prescription is necessary, we can send it directly to your pharmacy.If lab work or other testing is needed, we can help arrange a place/time for that to be done at a later date.If during the course of the call the provider feels a video visit is not appropriate, then your insurance company will not be billed.       Allergies and medications were reviewed and travel screening complete.     A test connection was Completed with Pt? No    I thanked them for their time to cover this information     Lola Aviles

## 2021-01-21 ENCOUNTER — OFFICE VISIT (OUTPATIENT)
Dept: FAMILY MEDICINE | Facility: CLINIC | Age: 61
End: 2021-01-21
Payer: COMMERCIAL

## 2021-01-21 VITALS
HEIGHT: 70 IN | SYSTOLIC BLOOD PRESSURE: 129 MMHG | WEIGHT: 217 LBS | DIASTOLIC BLOOD PRESSURE: 78 MMHG | TEMPERATURE: 96.1 F | HEART RATE: 70 BPM | BODY MASS INDEX: 31.07 KG/M2 | OXYGEN SATURATION: 96 %

## 2021-01-21 DIAGNOSIS — S46.211A TEAR OF RIGHT BICEPS MUSCLE, INITIAL ENCOUNTER: Primary | ICD-10-CM

## 2021-01-21 DIAGNOSIS — I26.99 ACUTE PULMONARY EMBOLISM WITHOUT ACUTE COR PULMONALE, UNSPECIFIED PULMONARY EMBOLISM TYPE (H): ICD-10-CM

## 2021-01-21 PROCEDURE — 99213 OFFICE O/P EST LOW 20 MIN: CPT | Performed by: FAMILY MEDICINE

## 2021-01-21 ASSESSMENT — MIFFLIN-ST. JEOR: SCORE: 1790.56

## 2021-01-21 NOTE — PROGRESS NOTES
"  Assessment & Plan     (Y09.482Q) Partial Tear of right biceps muscle, initial encounter  (primary encounter diagnosis)    Rest, ice, compresion, elevation, nsaids.  He has full rom of shoulder and elbow.  Will give it 1-2 weeks and see how he recovers.  If pain, swelling strength rom are decrease then to sports medicine.     (I26.99) Acute pulmonary embolism without acute cor pulmonale, unspecified pulmonary embolism type (H)  He stopped Xarelto after 6 months of treatment in 12/2020  From last hematology visit   Humble is a 61 yo man with history of pulmonary embolism 6 months ago who has been on Xarelto since the event.  He is here today to discuss discontinuation of anticoagulation.   His event was minimally provoked by sitting on high chair for prolonged periods of time. His situation is in a \"gray area\", in which it is not clear if the PE should be declared \"provoked\" vs \"unprovoked\".  He labs were checked related to anti-phospholipid syndrome which were negative.  Repeat D-dimer off anticoagulation (only 8 days off) was negative.  No reason to check for inherited thrombophilias, since no family ho VTE.     No need to repeat chest CT if he is feeling well and back to all usual activities.      Humble would like to strongly consider discontinuation of anticoagulation.  We discussed remaining off anticoagulation verses restarting and remaining on longer term anticoagulation for prevention of thrombosis.  He understands that we cannot predict if he will have a recurrent event.  He will discuss with his wife and call us if he wants to go back on Xarelto. Could consider Xarelto 10mg daily if decided to remain on longer term anticoagulation.     Reviewed times of increased clotting risk in the future in the event he stays off anticoagulation.  He may need preventative anticoagulation for surgeries, hospitalizations or prolonged travel.       Review of external notes as documented above   Review of prior external note(s) " "from - Outside records from hematology      8 minutes spent on the date of the encounter doing chart review, review of outside records, review of test results and patient visit         No follow-ups on file.    Zafar Fragoso MD  [facility] MARSHA Kate is a 60 year old who presents to clinic today for the following health issues   HPI       Musculoskeletal problem/pain  Onset/Duration:  Right arm, Tuesday night was at dog training with dog and felt something give in the right arm  Description  Location: elbow area  - right  Joint Swelling: YES - in hands  Redness: no  Pain: YES  Warmth: no  Intensity:  moderate  Progression of Symptoms:  improving  Accompanying signs and symptoms:   Numbness/tingling/weakness: YES - in fingers.   History  Trauma to the area: YES  Previous similar problem: no  Previous evaluation:  no  Precipitating or alleviating factors:  Aggravating factors include: picking up items   Therapies tried and outcome: ice and Ibuprofen      Review of Systems   Constitutional, HEENT, cardiovascular, pulmonary, gi and gu systems are negative, except as otherwise noted.      Objective    /78   Pulse 70   Temp 96.1  F (35.6  C) (Tympanic)   Ht 1.778 m (5' 10\")   Wt 98.4 kg (217 lb)   SpO2 96%   BMI 31.14 kg/m    Body mass index is 31.14 kg/m .  Physical Exam   GENERAL: healthy, alert and no distress  NECK: no adenopathy, no asymmetry, masses, or scars and thyroid normal to palpation  RESP: lungs clear to auscultation - no rales, rhonchi or wheezes  CV: regular rate and rhythm, normal S1 S2, no S3 or S4, no murmur, click or rub, no peripheral edema and peripheral pulses strong  ABDOMEN: soft, nontender, no hepatosplenomegaly, no masses and bowel sounds normal  MS: no gross musculoskeletal defects noted, no edema          "

## 2021-02-09 ENCOUNTER — TRANSFERRED RECORDS (OUTPATIENT)
Dept: HEALTH INFORMATION MANAGEMENT | Facility: CLINIC | Age: 61
End: 2021-02-09

## 2021-03-20 ENCOUNTER — HEALTH MAINTENANCE LETTER (OUTPATIENT)
Age: 61
End: 2021-03-20

## 2021-09-04 ENCOUNTER — HEALTH MAINTENANCE LETTER (OUTPATIENT)
Age: 61
End: 2021-09-04

## 2021-11-19 ASSESSMENT — ENCOUNTER SYMPTOMS
EYE PAIN: 0
CHILLS: 0
SORE THROAT: 0
COUGH: 0
DIZZINESS: 0
PARESTHESIAS: 0
PALPITATIONS: 0
HEMATURIA: 0
NERVOUS/ANXIOUS: 0
MYALGIAS: 0
NAUSEA: 0
WEAKNESS: 0
FEVER: 0
HEARTBURN: 0
CONSTIPATION: 0
DYSURIA: 0
JOINT SWELLING: 0
FREQUENCY: 0
ABDOMINAL PAIN: 0
ARTHRALGIAS: 1
DIARRHEA: 0
HEADACHES: 0
SHORTNESS OF BREATH: 0
HEMATOCHEZIA: 0

## 2021-11-22 ENCOUNTER — OFFICE VISIT (OUTPATIENT)
Dept: FAMILY MEDICINE | Facility: CLINIC | Age: 61
End: 2021-11-22
Payer: COMMERCIAL

## 2021-11-22 VITALS
HEIGHT: 70 IN | WEIGHT: 216 LBS | DIASTOLIC BLOOD PRESSURE: 77 MMHG | HEART RATE: 63 BPM | RESPIRATION RATE: 16 BRPM | TEMPERATURE: 97.5 F | BODY MASS INDEX: 30.92 KG/M2 | SYSTOLIC BLOOD PRESSURE: 121 MMHG

## 2021-11-22 DIAGNOSIS — Z00.00 ROUTINE GENERAL MEDICAL EXAMINATION AT A HEALTH CARE FACILITY: Primary | ICD-10-CM

## 2021-11-22 DIAGNOSIS — Z23 NEED FOR VACCINATION: ICD-10-CM

## 2021-11-22 DIAGNOSIS — Z23 NEED FOR PROPHYLACTIC VACCINATION AND INOCULATION AGAINST INFLUENZA: ICD-10-CM

## 2021-11-22 LAB
FASTING STATUS PATIENT QL REPORTED: YES
GLUCOSE BLD-MCNC: 87 MG/DL (ref 70–99)

## 2021-11-22 PROCEDURE — 90682 RIV4 VACC RECOMBINANT DNA IM: CPT | Performed by: FAMILY MEDICINE

## 2021-11-22 PROCEDURE — 90471 IMMUNIZATION ADMIN: CPT | Performed by: FAMILY MEDICINE

## 2021-11-22 PROCEDURE — 36415 COLL VENOUS BLD VENIPUNCTURE: CPT | Performed by: FAMILY MEDICINE

## 2021-11-22 PROCEDURE — 99396 PREV VISIT EST AGE 40-64: CPT | Mod: 25 | Performed by: FAMILY MEDICINE

## 2021-11-22 PROCEDURE — 82947 ASSAY GLUCOSE BLOOD QUANT: CPT | Performed by: FAMILY MEDICINE

## 2021-11-22 PROCEDURE — 90472 IMMUNIZATION ADMIN EACH ADD: CPT | Performed by: FAMILY MEDICINE

## 2021-11-22 PROCEDURE — 90750 HZV VACC RECOMBINANT IM: CPT | Performed by: FAMILY MEDICINE

## 2021-11-22 ASSESSMENT — ENCOUNTER SYMPTOMS
COUGH: 0
CONSTIPATION: 0
NERVOUS/ANXIOUS: 0
CHILLS: 0
PALPITATIONS: 0
HEMATOCHEZIA: 0
JOINT SWELLING: 0
SORE THROAT: 0
NAUSEA: 0
MYALGIAS: 0
FEVER: 0
SHORTNESS OF BREATH: 0
WEAKNESS: 0
FREQUENCY: 0
DYSURIA: 0
DIZZINESS: 0
ARTHRALGIAS: 1
HEMATURIA: 0
ABDOMINAL PAIN: 0
HEADACHES: 0
DIARRHEA: 0
PARESTHESIAS: 0
HEARTBURN: 0
EYE PAIN: 0

## 2021-11-22 ASSESSMENT — PAIN SCALES - GENERAL: PAINLEVEL: NO PAIN (0)

## 2021-11-22 ASSESSMENT — MIFFLIN-ST. JEOR: SCORE: 1781.02

## 2021-11-22 NOTE — PROGRESS NOTES
SUBJECTIVE:   CC: Cristopher Cuellar is an 61 year old male who presents for preventative health visit.     Patient has been advised of split billing requirements and indicates understanding: Yes     Healthy Habits:     Getting at least 3 servings of Calcium per day:  Yes    Bi-annual eye exam:  Yes    Dental care twice a year:  Yes    Sleep apnea or symptoms of sleep apnea:  None    Diet:  Regular (no restrictions)    Frequency of exercise:  2-3 days/week    Duration of exercise:  30-45 minutes    Taking medications regularly:  Yes    Medication side effects:  None    PHQ-2 Total Score: 0    Additional concerns today:  No        Today's PHQ-2 Score:   PHQ-2 (  [employer]) 2021   Q1: Little interest or pleasure in doing things 0   Q2: Feeling down, depressed or hopeless 0   PHQ-2 Score 0   PHQ-2 Total Score (12-17 Years)- Positive if 3 or more points; Administer PHQ-A if positive -   Q1: Little interest or pleasure in doing things Not at all   Q2: Feeling down, depressed or hopeless Not at all   PHQ-2 Score 0       Abuse: Current or Past(Physical, Sexual or Emotional)- No  Do you feel safe in your environment? Yes    Have you ever done Advance Care Planning? (For example, a Health Directive, POLST, or a discussion with a medical provider or your loved ones about your wishes): No, advance care planning information given to patient to review.  Advanced care planning was discussed at today's visit.    Social History     Tobacco Use     Smoking status: Former Smoker     Packs/day: 0.00     Years: 0.00     Pack years: 0.00     Types: Cigars     Quit date: 2014     Years since quittin.0     Smokeless tobacco: Never Used     Tobacco comment: 1 cigar per day during the summer   Substance Use Topics     Alcohol use: No     If you drink alcohol do you typically have >3 drinks per day or >7 drinks per week? No    No flowsheet data found.    Last PSA:   PSA   Date Value Ref Range Status   2020 1.42 0 - 4 ug/L  Final     Comment:     Assay Method:  Chemiluminescence using Siemens Vista analyzer     Reviewed orders with patient. Reviewed health maintenance and updated orders accordingly - Yes  BP Readings from Last 3 Encounters:   21 121/77   21 129/78   20 119/77    Wt Readings from Last 3 Encounters:   21 98 kg (216 lb)   21 98.4 kg (217 lb)   20 94.8 kg (209 lb)           Patient Active Problem List   Diagnosis     Obesity     Health Care Home     CARDIOVASCULAR SCREENING; LDL GOAL LESS THAN 160     Advanced directives, counseling/discussion     Acute pulmonary embolism without acute cor pulmonale, unspecified pulmonary embolism type (H)     Past Surgical History:   Procedure Laterality Date     COLONOSCOPY  3/1/2013    Procedure: COLONOSCOPY;  colonoscopy;  Surgeon: Emani Mckee MD;  Location: [facility]     KNEE SURGERY      Medial meniscus tear, left     NOSE SURGERY      Repair broken nose     ORTHOPEDIC SURGERY  2019       Social History     Tobacco Use     Smoking status: Former Smoker     Packs/day: 0.00     Years: 0.00     Pack years: 0.00     Types: Cigars     Quit date: 2014     Years since quittin.0     Smokeless tobacco: Never Used     Tobacco comment: 1 cigar per day during the summer   Substance Use Topics     Alcohol use: No     Family History   Problem Relation Age of Onset     Cancer Father         Lung     Heart Disease Father         MI     Other Cancer Father      Heart Disease Paternal Grandfather         MI     Hypertension Mother      Hyperlipidemia Mother          Current Outpatient Medications   Medication Sig Dispense Refill     Ascorbic Acid (VITAMIN C PO) Take 1 tablet by mouth daily       Cholecalciferol (VITAMIN D3) 1000 UNIT TABS Take 2,000 mg by mouth daily.       doxylamine (UNISOM) 25 MG TABS tablet Take 12.5 mg by mouth At Bedtime       fluticasone (FLONASE) 50 MCG/ACT spray Spray 1-2 sprays into both nostrils daily 1 Bottle 11  "    Allergies   Allergen Reactions     Pcn [Penicillins]        Reviewed and updated as needed this visit by clinical staff  Tobacco  Allergies  Meds   Med Hx  Surg Hx  Fam Hx  Soc Hx     Reviewed and updated as needed this visit by Provider               Review of Systems   Constitutional: Negative for chills and fever.   HENT: Negative for congestion, ear pain, hearing loss and sore throat.    Eyes: Negative for pain and visual disturbance.   Respiratory: Negative for cough and shortness of breath.    Cardiovascular: Negative for chest pain, palpitations and peripheral edema.   Gastrointestinal: Negative for abdominal pain, constipation, diarrhea, heartburn, hematochezia and nausea.   Genitourinary: Negative for dysuria, frequency, genital sores, hematuria, impotence, penile discharge and urgency.   Musculoskeletal: Positive for arthralgias. Negative for joint swelling and myalgias.   Skin: Negative for rash.   Neurological: Negative for dizziness, weakness, headaches and paresthesias.   Psychiatric/Behavioral: Negative for mood changes. The patient is not nervous/anxious.      CONSTITUTIONAL: NEGATIVE for fever, chills, change in weight  INTEGUMENTARY/SKIN: NEGATIVE for worrisome rashes, moles or lesions  EYES: NEGATIVE for vision changes or irritation  ENT: NEGATIVE for ear, mouth and throat problems  RESP: NEGATIVE for significant cough or SOB  CV: NEGATIVE for chest pain, palpitations or peripheral edema  GI: NEGATIVE for nausea, abdominal pain, heartburn, or change in bowel habits   male: negative for dysuria, hematuria, decreased urinary stream, erectile dysfunction, urethral discharge  MUSCULOSKELETAL: NEGATIVE for significant arthralgias or myalgia  NEURO: NEGATIVE for weakness, dizziness or paresthesias  PSYCHIATRIC: NEGATIVE for changes in mood or affect    OBJECTIVE:   /77   Pulse 63   Temp 97.5  F (36.4  C) (Tympanic)   Resp 16   Ht 1.778 m (5' 10\")   Wt 98 kg (216 lb)   BMI 30.99 " "kg/m      Physical Exam  GENERAL: healthy, alert and no distress  NECK: no adenopathy, no asymmetry, masses, or scars and thyroid normal to palpation  RESP: lungs clear to auscultation - no rales, rhonchi or wheezes  CV: regular rate and rhythm, normal S1 S2, no S3 or S4, no murmur, click or rub, no peripheral edema and peripheral pulses strong  ABDOMEN: soft, nontender, no hepatosplenomegaly, no masses and bowel sounds normal  MS: no gross musculoskeletal defects noted, no edema    Diagnostic Test Results:  Labs reviewed in Epic    ASSESSMENT/PLAN:   (Z00.00) Routine general medical examination at a health care facility  (primary encounter diagnosis)    (Z23) Need for prophylactic vaccination and inoculation against influenza  Plan: VACCINE ADMINISTRATION [facility], EACH ADDITIONAL    (Z23) Need for vaccination  Plan: VACCINE ADMINISTRATION, INITIAL       Patient has been advised of split billing requirements and indicates understanding: Yes  COUNSELING:   Reviewed preventive health counseling, as reflected in patient instructions       Regular exercise       Healthy diet/nutrition       Vision screening       Hearing screening       Colon cancer screening       Prostate cancer screening    Estimated body mass index is 30.99 kg/m  as calculated from the following:    Height as of this encounter: 1.778 m (5' 10\").    Weight as of this encounter: 98 kg (216 lb).     Weight management plan: Discussed healthy diet and exercise guidelines    He reports that he quit smoking about 7 years ago. His smoking use included cigars. He smoked 0.00 packs per day for 0.00 years. He has never used smokeless tobacco.      Counseling Resources:  ATP IV Guidelines  Pooled Cohorts Equation Calculator  FRAX Risk Assessment  ICSI Preventive Guidelines  Dietary Guidelines for Americans, 2010  USDA's MyPlate  ASA Prophylaxis  Lung CA Screening    Zafar Fragoso MD  [facility]  "

## 2021-11-22 NOTE — PATIENT INSTRUCTIONS
The 10-year ASCVD risk score (Thad HENDRIX Jr., et al., 2013) is: 11.9%    Values used to calculate the score:      Age: 61 years      Sex: Male      Is Non- : No      Diabetic: No      Tobacco smoker: No      Systolic Blood Pressure: 121 mmHg      Is BP treated: No      HDL Cholesterol: 46 mg/dL      Total Cholesterol: 236 mg/dL        Preventive Health Recommendations  Male Ages 50 - 64    Yearly exam:             See your health care provider every year in order to  o   Review health changes.   o   Discuss preventive care.    o   Review your medicines if your doctor has prescribed any.     Have a cholesterol test every 5 years, or more frequently if you are at risk for high cholesterol/heart disease.     Have a diabetes test (fasting glucose) every three years. If you are at risk for diabetes, you should have this test more often.     Have a colonoscopy at age 50, or have a yearly FIT test (stool test). These exams will check for colon cancer.      Talk with your health care provider about whether or not a prostate cancer screening test (PSA) is right for you.    You should be tested each year for STDs (sexually transmitted diseases), if you re at risk.     Shots: Get a flu shot each year. Get a tetanus shot every 10 years.     Nutrition:    Eat at least 5 servings of fruits and vegetables daily.     Eat whole-grain bread, whole-wheat pasta and brown rice instead of white grains and rice.     Get adequate Calcium and Vitamin D.     Lifestyle    Exercise for at least 150 minutes a week (30 minutes a day, 5 days a week). This will help you control your weight and prevent disease.     Limit alcohol to one drink per day.     No smoking.     Wear sunscreen to prevent skin cancer.     See your dentist every six months for an exam and cleaning.     See your eye doctor every 1 to 2 years.

## 2021-12-08 ENCOUNTER — LAB (OUTPATIENT)
Dept: LAB | Facility: CLINIC | Age: 61
End: 2021-12-08
Payer: COMMERCIAL

## 2021-12-08 DIAGNOSIS — Z13.6 CARDIOVASCULAR SCREENING; LDL GOAL LESS THAN 160: ICD-10-CM

## 2021-12-08 LAB
CHOLEST SERPL-MCNC: 172 MG/DL
FASTING STATUS PATIENT QL REPORTED: YES
HDLC SERPL-MCNC: 42 MG/DL
LDLC SERPL CALC-MCNC: 99 MG/DL
NONHDLC SERPL-MCNC: 130 MG/DL
TRIGL SERPL-MCNC: 155 MG/DL

## 2021-12-08 PROCEDURE — 36415 COLL VENOUS BLD VENIPUNCTURE: CPT

## 2021-12-08 PROCEDURE — 80061 LIPID PANEL: CPT

## 2022-05-25 ENCOUNTER — ALLIED HEALTH/NURSE VISIT (OUTPATIENT)
Dept: FAMILY MEDICINE | Facility: CLINIC | Age: 62
End: 2022-05-25
Payer: COMMERCIAL

## 2022-05-25 DIAGNOSIS — Z23 NEED FOR VACCINATION: Primary | ICD-10-CM

## 2022-05-25 PROCEDURE — 90471 IMMUNIZATION ADMIN: CPT

## 2022-05-25 PROCEDURE — 90750 HZV VACC RECOMBINANT IM: CPT

## 2022-05-25 NOTE — PROGRESS NOTES
Prior to immunization administration, verified patients identity using patient s name and date of birth. Please see Immunization Activity for additional information.     Screening Questionnaire for Adult Immunization    Are you sick today?   No   Do you have allergies to medications, food, a vaccine component or latex?   No   Have you ever had a serious reaction after receiving a vaccination?   No   Do you have a long-term health problem with heart, lung, kidney, or metabolic disease (e.g., diabetes), asthma, a blood disorder, no spleen, complement component deficiency, a cochlear implant, or a spinal fluid leak?  Are you on long-term aspirin therapy?   No   Do you have cancer, leukemia, HIV/AIDS, or any other immune system problem?   No   Do you have a parent, brother, or sister with an immune system problem?   No   In the past 3 months, have you taken medications that affect  your immune system, such as prednisone, other steroids, or anticancer drugs; drugs for the treatment of rheumatoid arthritis, Crohn s disease, or psoriasis; or have you had radiation treatments?   No   Have you had a seizure, or a brain or other nervous system problem?   No   During the past year, have you received a transfusion of blood or blood    products, or been given immune (gamma) globulin or antiviral drug?   No   For women: Are you pregnant or is there a chance you could become       pregnant during the next month?   No   Have you received any vaccinations in the past 4 weeks?   No     Immunization questionnaire answers were all negative.        Per orders of Dr. Светлана Patten, injection of Shingrix given by Parul Cantor. Patient instructed to remain in clinic for 15 minutes afterwards, and to report any adverse reaction to me immediately.       Screening performed by Parul Cantor on 5/25/2022 at 9:29 AM.

## 2022-06-27 ENCOUNTER — TRANSFERRED RECORDS (OUTPATIENT)
Dept: FAMILY MEDICINE | Facility: CLINIC | Age: 62
End: 2022-06-27

## 2022-10-16 ENCOUNTER — HEALTH MAINTENANCE LETTER (OUTPATIENT)
Age: 62
End: 2022-10-16

## 2022-11-09 ENCOUNTER — IMMUNIZATION (OUTPATIENT)
Dept: FAMILY MEDICINE | Facility: CLINIC | Age: 62
End: 2022-11-09
Payer: COMMERCIAL

## 2022-11-09 PROCEDURE — 90471 IMMUNIZATION ADMIN: CPT

## 2022-11-09 PROCEDURE — 90682 RIV4 VACC RECOMBINANT DNA IM: CPT

## 2023-03-15 ASSESSMENT — ENCOUNTER SYMPTOMS
JOINT SWELLING: 0
HEARTBURN: 0
ARTHRALGIAS: 1
COUGH: 0
HEMATOCHEZIA: 0
SORE THROAT: 0
EYE PAIN: 0
CHILLS: 0
WEAKNESS: 0
FEVER: 0
NERVOUS/ANXIOUS: 0
HEMATURIA: 0
HEADACHES: 1
DYSURIA: 0
SHORTNESS OF BREATH: 0
DIARRHEA: 0
NAUSEA: 0
PARESTHESIAS: 0
CONSTIPATION: 0
MYALGIAS: 1
FREQUENCY: 0
ABDOMINAL PAIN: 0
PALPITATIONS: 0
DIZZINESS: 0

## 2023-03-22 ENCOUNTER — OFFICE VISIT (OUTPATIENT)
Dept: FAMILY MEDICINE | Facility: CLINIC | Age: 63
End: 2023-03-22
Payer: COMMERCIAL

## 2023-03-22 VITALS
OXYGEN SATURATION: 94 % | SYSTOLIC BLOOD PRESSURE: 112 MMHG | HEIGHT: 70 IN | DIASTOLIC BLOOD PRESSURE: 76 MMHG | HEART RATE: 64 BPM | WEIGHT: 222.2 LBS | TEMPERATURE: 97.1 F | BODY MASS INDEX: 31.81 KG/M2 | RESPIRATION RATE: 14 BRPM

## 2023-03-22 DIAGNOSIS — Z12.11 SCREEN FOR COLON CANCER: ICD-10-CM

## 2023-03-22 DIAGNOSIS — I26.99 ACUTE PULMONARY EMBOLISM WITHOUT ACUTE COR PULMONALE, UNSPECIFIED PULMONARY EMBOLISM TYPE (H): ICD-10-CM

## 2023-03-22 DIAGNOSIS — Z12.5 SCREENING FOR PROSTATE CANCER: ICD-10-CM

## 2023-03-22 DIAGNOSIS — R49.0 VOICE HOARSENESS: Primary | ICD-10-CM

## 2023-03-22 DIAGNOSIS — Z87.891 PERSONAL HISTORY OF TOBACCO USE: ICD-10-CM

## 2023-03-22 DIAGNOSIS — Z12.12 ENCOUNTER FOR COLORECTAL CANCER SCREENING: ICD-10-CM

## 2023-03-22 DIAGNOSIS — Z00.00 ROUTINE GENERAL MEDICAL EXAMINATION AT A HEALTH CARE FACILITY: ICD-10-CM

## 2023-03-22 DIAGNOSIS — Z12.11 ENCOUNTER FOR COLORECTAL CANCER SCREENING: ICD-10-CM

## 2023-03-22 LAB — PSA SERPL DL<=0.01 NG/ML-MCNC: 1.23 NG/ML (ref 0–4.5)

## 2023-03-22 PROCEDURE — G0103 PSA SCREENING: HCPCS | Performed by: FAMILY MEDICINE

## 2023-03-22 PROCEDURE — 36415 COLL VENOUS BLD VENIPUNCTURE: CPT | Performed by: FAMILY MEDICINE

## 2023-03-22 PROCEDURE — 99396 PREV VISIT EST AGE 40-64: CPT | Performed by: FAMILY MEDICINE

## 2023-03-22 RX ORDER — VIT C/B6/B5/MAGNESIUM/HERB 173 50-5-6-5MG
CAPSULE ORAL
Status: ON HOLD | COMMUNITY
Start: 2022-11-01 | End: 2023-04-14

## 2023-03-22 ASSESSMENT — ENCOUNTER SYMPTOMS
DIARRHEA: 0
PALPITATIONS: 0
MYALGIAS: 1
NERVOUS/ANXIOUS: 0
HEADACHES: 1
DYSURIA: 0
DIZZINESS: 0
JOINT SWELLING: 0
SORE THROAT: 0
CHILLS: 0
WEAKNESS: 0
COUGH: 0
HEARTBURN: 0
FREQUENCY: 0
EYE PAIN: 0
ABDOMINAL PAIN: 0
PARESTHESIAS: 0
ARTHRALGIAS: 1
NAUSEA: 0
HEMATOCHEZIA: 0
SHORTNESS OF BREATH: 0
CONSTIPATION: 0
FEVER: 0
HEMATURIA: 0

## 2023-03-22 ASSESSMENT — PAIN SCALES - GENERAL: PAINLEVEL: MODERATE PAIN (4)

## 2023-03-22 NOTE — PROGRESS NOTES
SUBJECTIVE:   CC: Humble is an 62 year old who presents for preventative health visit.     Patient has been advised of split billing requirements and indicates understanding: Yes     Healthy Habits:     Getting at least 3 servings of Calcium per day:  NO    Bi-annual eye exam:  Yes    Dental care twice a year:  Yes    Sleep apnea or symptoms of sleep apnea:  None    Diet:  Regular (no restrictions)    Frequency of exercise:  2-3 days/week    Duration of exercise:  30-45 minutes    Taking medications regularly:  Yes    Medication side effects:  None    PHQ-2 Total Score: 0    Additional concerns today:  Yes    -Wanting to discuss getting labs done for blood type.      Today's PHQ-2 Score:   PHQ-2 (  [employer]) 3/15/2023   Q1: Little interest or pleasure in doing things 0   Q2: Feeling down, depressed or hopeless 0   PHQ-2 Score 0   PHQ-2 Total Score (12-17 Years)- Positive if 3 or more points; Administer PHQ-A if positive -   Q1: Little interest or pleasure in doing things Not at all   Q2: Feeling down, depressed or hopeless Not at all   PHQ-2 Score 0           Social History     Tobacco Use     Smoking status: Former     Packs/day: 0.00     Years: 0.00     Pack years: 0.00     Types: Cigars, Cigarettes     Quit date: 2014     Years since quittin.3     Smokeless tobacco: Never     Tobacco comments:     1 cigar per day during the summer   Substance Use Topics     Alcohol use: No         Alcohol Use 3/15/2023   Prescreen: >3 drinks/day or >7 drinks/week? Not Applicable   No flowsheet data found.    Last PSA:   PSA   Date Value Ref Range Status   2020 1.42 0 - 4 ug/L Final     Comment:     Assay Method:  Chemiluminescence using Siemens Vista analyzer       Reviewed orders with patient. Reviewed health maintenance and updated orders accordingly - Yes  BP Readings from Last 3 Encounters:   23 112/76   21 121/77   21 129/78    Wt Readings from Last 3 Encounters:   23 100.8 kg (222 lb  3.2 oz)   21 98 kg (216 lb)   21 98.4 kg (217 lb)         Patient Active Problem List   Diagnosis     Obesity     Health Care Home     CARDIOVASCULAR SCREENING; LDL GOAL LESS THAN 160     Advanced directives, counseling/discussion     Acute pulmonary embolism without acute cor pulmonale, unspecified pulmonary embolism type (H)     Past Surgical History:   Procedure Laterality Date     COLONOSCOPY  3/1/2013    Procedure: COLONOSCOPY;  colonoscopy;  Surgeon: Emani Mckee MD;  Location: [facility]     KNEE SURGERY      Medial meniscus tear, left     NOSE SURGERY      Repair broken nose     ORTHOPEDIC SURGERY  2019       Social History     Tobacco Use     Smoking status: Former     Packs/day: 0.00     Years: 0.00     Pack years: 0.00     Types: Cigars, Cigarettes     Quit date: 2014     Years since quittin.3     Smokeless tobacco: Never     Tobacco comments:     1 cigar per day during the summer   Substance Use Topics     Alcohol use: No     Family History   Problem Relation Age of Onset     Cancer Father         Lung     Heart Disease Father         MI     Other Cancer Father      Heart Disease Paternal Grandfather         MI     Hypertension Mother      Hyperlipidemia Mother          Current Outpatient Medications   Medication Sig Dispense Refill     Ascorbic Acid (VITAMIN C PO) Take 1 tablet by mouth daily       Cholecalciferol (VITAMIN D3) 1000 UNIT TABS Take 2,000 mg by mouth daily.       doxylamine (UNISOM) 25 MG TABS tablet Take 12.5 mg by mouth At Bedtime       fluticasone (FLONASE) 50 MCG/ACT spray Spray 1-2 sprays into both nostrils daily 1 Bottle 11     Omega-3 Fatty Acids (FISH OIL PO) Take by mouth daily       study - curcumin, IDS# [id number], 500 MG capsule        Allergies   Allergen Reactions     Pcn [Penicillins]        Reviewed and updated as needed this visit by clinical staff     Meds   Med Hx  Surg Hx  Fam Hx          Reviewed and updated as needed this visit by  "Provider                     Review of Systems   Constitutional: Negative for chills and fever.   HENT: Negative for congestion, ear pain, hearing loss and sore throat.    Eyes: Negative for pain and visual disturbance.   Respiratory: Negative for cough and shortness of breath.    Cardiovascular: Negative for chest pain, palpitations and peripheral edema.   Gastrointestinal: Negative for abdominal pain, constipation, diarrhea, heartburn, hematochezia and nausea.   Genitourinary: Negative for dysuria, frequency, genital sores, hematuria, impotence, penile discharge and urgency.   Musculoskeletal: Positive for arthralgias and myalgias. Negative for joint swelling.   Skin: Negative for rash.   Neurological: Positive for headaches. Negative for dizziness, weakness and paresthesias.   Psychiatric/Behavioral: Negative for mood changes. The patient is not nervous/anxious.      CONSTITUTIONAL: NEGATIVE for fever, chills, change in weight  INTEGUMENTARY/SKIN: NEGATIVE for worrisome rashes, moles or lesions  EYES: NEGATIVE for vision changes or irritation  ENT: NEGATIVE for ear, mouth and throat problems  RESP: NEGATIVE for significant cough or SOB  CV: NEGATIVE for chest pain, palpitations or peripheral edema  GI: NEGATIVE for nausea, abdominal pain, heartburn, or change in bowel habits   male: negative for dysuria, hematuria, decreased urinary stream, erectile dysfunction, urethral discharge  MUSCULOSKELETAL: NEGATIVE for significant arthralgias or myalgia  NEURO: NEGATIVE for weakness, dizziness or paresthesias  PSYCHIATRIC: NEGATIVE for changes in mood or affect    OBJECTIVE:   /76   Pulse 64   Temp 97.1  F (36.2  C) (Tympanic)   Resp 14   Ht 1.778 m (5' 10\")   Wt 100.8 kg (222 lb 3.2 oz)   SpO2 94%   BMI 31.88 kg/m      Physical Exam  GENERAL: healthy, alert and no distress  NECK: no adenopathy, no asymmetry, masses, or scars and thyroid normal to palpation  RESP: lungs clear to auscultation - no rales, " rhonchi or wheezes  CV: regular rate and rhythm, normal S1 S2, no S3 or S4, no murmur, click or rub, no peripheral edema and peripheral pulses strong  ABDOMEN: soft, nontender, no hepatosplenomegaly, no masses and bowel sounds normal  MS: no gross musculoskeletal defects noted, no edema        ASSESSMENT/PLAN:   (R49.0) Voice hoarseness  (primary encounter diagnosis)  Good control.  Continue currant medications, continue to monitor.     Hoarse voice:   Plan: Adult ENT  Referral    (Z00.00) Routine general medical examination at a health care facility    (I26.99) Acute pulmonary embolism without acute cor pulmonale, unspecified pulmonary embolism type (H)    (Z12.5) Screening for prostate cancer  Plan: PSA, screen    I discussed the best wayto find out your blood type is to donate blood.     (Z12.11,  Z12.12) Encounter for colorectal cancer screening  Plan: Colonoscopy Screening  Referral    Patient has been advised of split billing requirements and indicates understanding: Yes      COUNSELING:   Reviewed preventive health counseling, as reflected in patient instructions       Regular exercise       Healthy diet/nutrition       Vision screening       Hearing screening       Colorectal cancer screening       Prostate cancer screening        He reports that he quit smoking about 8 years ago. His smoking use included cigars. He has never used smokeless tobacco.        Zafar Fragoso MD  [facility]  Lung Cancer Screening Shared Decision Making Visit     Cristopher Cuellar, a 62 year old male, is eligible for lung cancer screening    History   Smoking Status     Former     Packs/day: 0.00     Years: 0.00     Types: Cigars     Quit date: 11/1/2014   Smokeless Tobacco     Never     Comment: 1 cigar per day during the summer       I have discussed with patient the risks and benefits of screening for lung cancer with low-dose CT.     The risks include:    radiation exposure: one low dose  chest CT has as much ionizing radiation as about 15 chest x-rays, or 6 months of background radiation living in [place]      false positives: most findings/nodules are NOT cancer, but some might still require additional diagnostic evaluation, including biopsy    over-diagnosis: some slow growing cancers that might never have been clinically significant will be detected and treated unnecessarily     The benefit of early detection of lung cancer is contingent upon adherence to annual screening or more frequent follow up if indicated.     Furthermore, to benefit from screening, Cristopher must be willing and able to undergo diagnostic procedures, if indicated. Although no specific guide is available for determining severity of comorbidities, it is reasonable to withhold screening in patients who have greater mortality risk from other diseases.     We did discuss that the best way to prevent lung cancer is to not smoke.    Some patients may value a numeric estimation of lung cancer risk when evaluating if lung cancer screening is right for them, here is one calculator:    ShouldIScreen

## 2023-03-22 NOTE — H&P (VIEW-ONLY)
SUBJECTIVE:   CC: Humble is an 62 year old who presents for preventative health visit.     Patient has been advised of split billing requirements and indicates understanding: Yes     Healthy Habits:     Getting at least 3 servings of Calcium per day:  NO    Bi-annual eye exam:  Yes    Dental care twice a year:  Yes    Sleep apnea or symptoms of sleep apnea:  None    Diet:  Regular (no restrictions)    Frequency of exercise:  2-3 days/week    Duration of exercise:  30-45 minutes    Taking medications regularly:  Yes    Medication side effects:  None    PHQ-2 Total Score: 0    Additional concerns today:  Yes    -Wanting to discuss getting labs done for blood type.      Today's PHQ-2 Score:   PHQ-2 (  [employer]) 3/15/2023   Q1: Little interest or pleasure in doing things 0   Q2: Feeling down, depressed or hopeless 0   PHQ-2 Score 0   PHQ-2 Total Score (12-17 Years)- Positive if 3 or more points; Administer PHQ-A if positive -   Q1: Little interest or pleasure in doing things Not at all   Q2: Feeling down, depressed or hopeless Not at all   PHQ-2 Score 0           Social History     Tobacco Use     Smoking status: Former     Packs/day: 0.00     Years: 0.00     Pack years: 0.00     Types: Cigars, Cigarettes     Quit date: 2014     Years since quittin.3     Smokeless tobacco: Never     Tobacco comments:     1 cigar per day during the summer   Substance Use Topics     Alcohol use: No         Alcohol Use 3/15/2023   Prescreen: >3 drinks/day or >7 drinks/week? Not Applicable   No flowsheet data found.    Last PSA:   PSA   Date Value Ref Range Status   2020 1.42 0 - 4 ug/L Final     Comment:     Assay Method:  Chemiluminescence using Siemens Vista analyzer       Reviewed orders with patient. Reviewed health maintenance and updated orders accordingly - Yes  BP Readings from Last 3 Encounters:   23 112/76   21 121/77   21 129/78    Wt Readings from Last 3 Encounters:   23 100.8 kg (222 lb  3.2 oz)   21 98 kg (216 lb)   21 98.4 kg (217 lb)         Patient Active Problem List   Diagnosis     Obesity     Health Care Home     CARDIOVASCULAR SCREENING; LDL GOAL LESS THAN 160     Advanced directives, counseling/discussion     Acute pulmonary embolism without acute cor pulmonale, unspecified pulmonary embolism type (H)     Past Surgical History:   Procedure Laterality Date     COLONOSCOPY  3/1/2013    Procedure: COLONOSCOPY;  colonoscopy;  Surgeon: Emani Mckee MD;  Location: [facility]     KNEE SURGERY      Medial meniscus tear, left     NOSE SURGERY      Repair broken nose     ORTHOPEDIC SURGERY  2019       Social History     Tobacco Use     Smoking status: Former     Packs/day: 0.00     Years: 0.00     Pack years: 0.00     Types: Cigars, Cigarettes     Quit date: 2014     Years since quittin.3     Smokeless tobacco: Never     Tobacco comments:     1 cigar per day during the summer   Substance Use Topics     Alcohol use: No     Family History   Problem Relation Age of Onset     Cancer Father         Lung     Heart Disease Father         MI     Other Cancer Father      Heart Disease Paternal Grandfather         MI     Hypertension Mother      Hyperlipidemia Mother          Current Outpatient Medications   Medication Sig Dispense Refill     Ascorbic Acid (VITAMIN C PO) Take 1 tablet by mouth daily       Cholecalciferol (VITAMIN D3) 1000 UNIT TABS Take 2,000 mg by mouth daily.       doxylamine (UNISOM) 25 MG TABS tablet Take 12.5 mg by mouth At Bedtime       fluticasone (FLONASE) 50 MCG/ACT spray Spray 1-2 sprays into both nostrils daily 1 Bottle 11     Omega-3 Fatty Acids (FISH OIL PO) Take by mouth daily       study - curcumin, IDS# [id number], 500 MG capsule        Allergies   Allergen Reactions     Pcn [Penicillins]        Reviewed and updated as needed this visit by clinical staff     Meds   Med Hx  Surg Hx  Fam Hx          Reviewed and updated as needed this visit by  "Provider                     Review of Systems   Constitutional: Negative for chills and fever.   HENT: Negative for congestion, ear pain, hearing loss and sore throat.    Eyes: Negative for pain and visual disturbance.   Respiratory: Negative for cough and shortness of breath.    Cardiovascular: Negative for chest pain, palpitations and peripheral edema.   Gastrointestinal: Negative for abdominal pain, constipation, diarrhea, heartburn, hematochezia and nausea.   Genitourinary: Negative for dysuria, frequency, genital sores, hematuria, impotence, penile discharge and urgency.   Musculoskeletal: Positive for arthralgias and myalgias. Negative for joint swelling.   Skin: Negative for rash.   Neurological: Positive for headaches. Negative for dizziness, weakness and paresthesias.   Psychiatric/Behavioral: Negative for mood changes. The patient is not nervous/anxious.      CONSTITUTIONAL: NEGATIVE for fever, chills, change in weight  INTEGUMENTARY/SKIN: NEGATIVE for worrisome rashes, moles or lesions  EYES: NEGATIVE for vision changes or irritation  ENT: NEGATIVE for ear, mouth and throat problems  RESP: NEGATIVE for significant cough or SOB  CV: NEGATIVE for chest pain, palpitations or peripheral edema  GI: NEGATIVE for nausea, abdominal pain, heartburn, or change in bowel habits   male: negative for dysuria, hematuria, decreased urinary stream, erectile dysfunction, urethral discharge  MUSCULOSKELETAL: NEGATIVE for significant arthralgias or myalgia  NEURO: NEGATIVE for weakness, dizziness or paresthesias  PSYCHIATRIC: NEGATIVE for changes in mood or affect    OBJECTIVE:   /76   Pulse 64   Temp 97.1  F (36.2  C) (Tympanic)   Resp 14   Ht 1.778 m (5' 10\")   Wt 100.8 kg (222 lb 3.2 oz)   SpO2 94%   BMI 31.88 kg/m      Physical Exam  GENERAL: healthy, alert and no distress  NECK: no adenopathy, no asymmetry, masses, or scars and thyroid normal to palpation  RESP: lungs clear to auscultation - no rales, " rhonchi or wheezes  CV: regular rate and rhythm, normal S1 S2, no S3 or S4, no murmur, click or rub, no peripheral edema and peripheral pulses strong  ABDOMEN: soft, nontender, no hepatosplenomegaly, no masses and bowel sounds normal  MS: no gross musculoskeletal defects noted, no edema        ASSESSMENT/PLAN:   (R49.0) Voice hoarseness  (primary encounter diagnosis)  Good control.  Continue currant medications, continue to monitor.     Hoarse voice:   Plan: Adult ENT  Referral    (Z00.00) Routine general medical examination at a health care facility    (I26.99) Acute pulmonary embolism without acute cor pulmonale, unspecified pulmonary embolism type (H)    (Z12.5) Screening for prostate cancer  Plan: PSA, screen    I discussed the best wayto find out your blood type is to donate blood.     (Z12.11,  Z12.12) Encounter for colorectal cancer screening  Plan: Colonoscopy Screening  Referral    Patient has been advised of split billing requirements and indicates understanding: Yes      COUNSELING:   Reviewed preventive health counseling, as reflected in patient instructions       Regular exercise       Healthy diet/nutrition       Vision screening       Hearing screening       Colorectal cancer screening       Prostate cancer screening        He reports that he quit smoking about 8 years ago. His smoking use included cigars. He has never used smokeless tobacco.        Zafar Fragoso MD  [facility]  Lung Cancer Screening Shared Decision Making Visit     Cristopher Cuellar, a 62 year old male, is eligible for lung cancer screening    History   Smoking Status     Former     Packs/day: 0.00     Years: 0.00     Types: Cigars     Quit date: 11/1/2014   Smokeless Tobacco     Never     Comment: 1 cigar per day during the summer       I have discussed with patient the risks and benefits of screening for lung cancer with low-dose CT.     The risks include:    radiation exposure: one low dose  chest CT has as much ionizing radiation as about 15 chest x-rays, or 6 months of background radiation living in [place]      false positives: most findings/nodules are NOT cancer, but some might still require additional diagnostic evaluation, including biopsy    over-diagnosis: some slow growing cancers that might never have been clinically significant will be detected and treated unnecessarily     The benefit of early detection of lung cancer is contingent upon adherence to annual screening or more frequent follow up if indicated.     Furthermore, to benefit from screening, Cristopher must be willing and able to undergo diagnostic procedures, if indicated. Although no specific guide is available for determining severity of comorbidities, it is reasonable to withhold screening in patients who have greater mortality risk from other diseases.     We did discuss that the best way to prevent lung cancer is to not smoke.    Some patients may value a numeric estimation of lung cancer risk when evaluating if lung cancer screening is right for them, here is one calculator:    ShouldIScreen

## 2023-03-22 NOTE — PATIENT INSTRUCTIONS
Preventive Health Recommendations  Male Ages 50 - 64    Yearly exam:             See your health care provider every year in order to  o   Review health changes.   o   Discuss preventive care.    o   Review your medicines if your doctor has prescribed any.     Have a cholesterol test every 5 years, or more frequently if you are at risk for high cholesterol/heart disease.     Have a diabetes test (fasting glucose) every three years. If you are at risk for diabetes, you should have this test more often.     Have a colonoscopy at age 50, or have a yearly FIT test (stool test). These exams will check for colon cancer.      Talk with your health care provider about whether or not a prostate cancer screening test (PSA) is right for you.    You should be tested each year for STDs (sexually transmitted diseases), if you re at risk.     Shots: Get a flu shot each year. Get a tetanus shot every 10 years.     Nutrition:    Eat at least 5 servings of fruits and vegetables daily.     Eat whole-grain bread, whole-wheat pasta and brown rice instead of white grains and rice.     Get adequate Calcium and Vitamin D.     Lifestyle    Exercise for at least 150 minutes a week (30 minutes a day, 5 days a week). This will help you control your weight and prevent disease.     Limit alcohol to one drink per day.     No smoking.     Wear sunscreen to prevent skin cancer.     See your dentist every six months for an exam and cleaning.     See your eye doctor every 1 to 2 years.    Lung Cancer Screening   Frequently Asked Questions  If you are at high-risk for lung cancer, getting screened with low-dose computed tomography (LDCT) every year can help save your life. This handout offers answers to some of the most common questions about lung cancer screening. If you have other questions, please call [phone] ([phone]).     What is it?  Lung cancer screening uses special X-ray technology to create an image of your lung tissue.  The exam is quick and easy and takes less than 10 seconds. We don t give you any medicine or use any needles. You can eat before and after the exam. You don t need to change your clothes as long as the clothing on your chest doesn t contain metal. But, you do need to be able to hold your breath for at least 6 seconds during the exam.    What is the goal of lung cancer screening?  The goal of lung cancer screening is to save lives. Many times, lung cancer is not found until a person starts having physical symptoms. Lung cancer screening can help detect lung cancer in the earliest stages when it may be easier to treat.    Who should be screened for lung cancer?  We suggest lung cancer screening for anyone who is at high-risk for lung cancer. You are in the high-risk group if you:      are between the ages of 55 and 79, and    have smoked at least 1 pack of cigarettes a day for 20 or more years, and    still smoke or have quit within the past 15 years.    However, if you have a new cough or shortness of breath, you should talk to your doctor before being screened.    Why does it matter if I have symptoms?  Certain symptoms can be a sign that you have a condition in your lungs that should be checked and treated by your doctor. These symptoms include fever, chest pain, a new or changing cough, shortness of breath that you have never felt before, coughing up blood or unexplained weight loss. Having any of these symptoms can greatly affect the results of lung cancer screening.       Should all smokers get an LDCT lung cancer screening exam?  It depends. Lung cancer screening is for a very specific group of men and women who have a history of heavy smoking over a long period of time (see  Who should be screened for lung cancer  above).  I am in the high-risk group, but have been diagnosed with cancer in the past. Is LDCT lung cancer screening right for me?  In some cases, you should not have LDCT lung screening, such as  when your doctor is already following your cancer with CT scan studies. Your doctor will help you decide if LDCT lung screening is right for you.  Do I need to have a screening exam every year?  Yes. If you are in the high-risk group described earlier, you should get an LDCT lung cancer screening exam every year until you are 79, or are no longer willing or able to undergo screening and possible procedures to diagnose and treat lung cancer.  How effective is LDCT at preventing death from lung cancer?  Studies have shown that LDCT lung cancer screening can lower the risk of death from lung cancer by 20 percent in people who are at high-risk.  What are the risks?  There are some risks and limitations of LDCT lung cancer screening. We want to make sure you understand the risks and benefits, so please let us know if you have any questions. Your doctor may want to talk with you more about these risks.    Radiation exposure: As with any exam that uses radiation, there is a very small increased risk of cancer. The amount of radiation in LDCT is small--about the same amount a person would get from a mammogram. Your doctor orders the exam when he or she feels the potential benefits outweigh the risks.    False negatives: No test is perfect, including LDCT. It is possible that you may have a medical condition, including lung cancer, that is not found during your exam. This is called a false negative result.    False positives and more testing: LDCT very often finds something in the lung that could be cancer, but in fact is not. This is called a false positive result. False positive tests often cause anxiety. To make sure these findings are not cancer, you may need to have more tests. These tests will be done only if you give us permission. Sometimes patients need a treatment that can have side effects, such as a biopsy. For more information on false positives, see  What can I expect from the results?     Findings not related  to lung cancer: Your LDCT exam also takes pictures of areas of your body next to your lungs. In a very small number of cases, the CT scan will show an abnormal finding in one of these areas, such as your kidneys, adrenal glands, liver or thyroid. This finding may not be serious, but you may need more tests. Your doctor can help you decide what other tests you may need, if any.  What can I expect from the results?  About 1 out of 4 LDCT exams will find something that may need more tests. Most of the time, these findings are lung nodules. Lung nodules are very small collections of tissue in the lung. These nodules are very common, and the vast majority--more than 97 percent--are not cancer (benign). Most are normal lymph nodes or small areas of scarring from past infections.  But, if a small lung nodule is found to be cancer, the cancer can be cured more than 90 percent of the time. To know if the nodule is cancer, we may need to get more images before your next yearly screening exam. If the nodule has suspicious features (for example, it is large, has an odd shape or grows over time), we will refer you to a specialist for further testing.  Will my doctor also get the results?  Yes. Your doctor will get a copy of your results.  Is it okay to keep smoking now that there s a cancer screening exam?  No. Tobacco is one of the strongest cancer-causing agents. It causes not only lung cancer, but other cancers and cardiovascular (heart) diseases as well. The damage caused by smoking builds over time. This means that the longer you smoke, the higher your risk of disease. While it is never too late to quit, the sooner you quit, the better.  Where can I find help to quit smoking?  The best way to prevent lung cancer is to stop smoking. If you have already quit smoking, congratulations and keep it up! For help on quitting smoking, please call [employer] at 7-380-QUIT-NOW (1-572.566.9744) or the [employer] Society at  1-462.110.3852 to find local resources near you.  One-on-one health coaching:  If you d prefer to work individually with a health care provider on tobacco cessation, we offer:      Medication Therapy Management:  Our specially trained pharmacists work closely with you and your doctor to help you quit smoking.  Call [phone] or [phone] (toll free).

## 2023-03-28 ENCOUNTER — TELEPHONE (OUTPATIENT)
Dept: SURGERY | Facility: CLINIC | Age: 63
End: 2023-03-28
Payer: COMMERCIAL

## 2023-03-28 NOTE — TELEPHONE ENCOUNTER
Screening Questions  BLUE  KIND OF PREP RED  LOCATION [review exclusion criteria] GREEN  SEDATION TYPE        Y Are you active on mychart?       [place] Ordering/Referring Provider?        [employer] What type of coverage do you have?      N Have you had a positive covid test in the last 14 days?     31.88 1. BMI  [BMI 40+ - review exclusion criteria]    Y  2. Are you able to give consent for your medical care? [IF NO,RN REVIEW]          N  3. Are you taking any prescription pain medications on a routine schedule   (ex narcotics: oxycodone, roxicodone, oxycontin,  and percocet)? [RN Review]        N  3a. EXTENDED PREP What kind of prescription?     Y 4. Do you have any chemical dependencies such as alcohol, street drugs, or methadone?        **If yes 3- 5 , please schedule with MAC sedation.**          IF YES TO ANY 6 - 10 - HOSPITAL SETTING ONLY.     N 6.   Do you need assistance transferring?     N 7.   Have you had a heart or lung transplant?    N 8.   Are you currently on dialysis?   N 9.   Do you use daily home oxygen?   N 10. Do you take nitroglycerin?   10a. n If yes, how often?     11. [FEMALES]  N Are you currently pregnant?    11a. N If yes, how many weeks? [ Greater than 12 weeks, OR NEEDED]    N 12. Do you have Pulmonary Hypertension? *NEED PAC APPT AT [facility] w/ MAC*     N 13. [review exclusion criteria]  Do you have any implantable devices in your body (pacemaker, defib, LVAD)?    N 14. In the past 6 months, have you had any heart related issues including cardiomyopathy or heart attack?     14a. N If yes, did it require cardiac stenting if so when?     N 15. Have you had a stroke or Transient ischemic attack (TIA - aka  mini stroke ) within 6 months?      N 16. Do you have mod to severe Obstructive Sleep Apnea?  [Hospital only]    N 17. Do you have SEVERE AND UNCONTROLLED asthma? *NEED PAC APPT AT [facility] w/MAC*     18. Are you currently taking any blood thinners?     18a. No. Continue to 19.   18b. Yes/no  "Blood Thinner: No [CONTINUE TO #19]    N 19. Do you take the medication Phentermine?    19a. If yes, \"Hold for 7 days before procedure.  Please consult your prescribing provider if you have questions about holding this medication.\"     n  20. Do you have chronic kidney disease?      n  21. Do you have a diagnosis of diabetes?     N  22. On a regular basis do you go 3-5 days between bowel movements?     See below 23. Preferred LOCAL Pharmacy for Pre Prescription    [ LIST ONLY ONE PHARMACY]        [id number] - [place], [place] - [address]        - CLOSING REMINDERS -    Informed patient they will need an adult    Cannot take any type of public or medical transportation alone    Conscious Sedation- Needs  for 6 hours after the procedure       MAC/General-Needs  for 24 hours after procedure    Pre-Procedure Covid test to be completed [[facility] PCR Testing Required]    Confirmed Nurse will call to complete assessment       - SCHEDULING DETAILS -  N Hospital Setting Required? If yes, what is the exclusion?: n   [place]  Surgeon    4-14-23  Date of Procedure  Lower Endoscopy [Colonoscopy]  Type of Procedure Scheduled  [facility] [place]-If you answer yes to questions #8, #20, #21Which Colonoscopy Prep was Sent?     GEN Sedation Type     N PAC / Pre-op Required                 "

## 2023-04-05 ENCOUNTER — HOSPITAL ENCOUNTER (OUTPATIENT)
Dept: CT IMAGING | Facility: CLINIC | Age: 63
Discharge: HOME | End: 2023-04-05
Attending: FAMILY MEDICINE | Admitting: FAMILY MEDICINE
Payer: COMMERCIAL

## 2023-04-05 DIAGNOSIS — Z87.891 PERSONAL HISTORY OF TOBACCO USE: ICD-10-CM

## 2023-04-05 PROCEDURE — 71271 CT THORAX LUNG CANCER SCR C-: CPT

## 2023-04-07 RX ORDER — BISACODYL 5 MG/1
TABLET, DELAYED RELEASE ORAL
Qty: 4 TABLET | Refills: 0 | Status: SHIPPED | OUTPATIENT
Start: 2023-04-07 | End: 2024-05-09

## 2023-04-12 ENCOUNTER — ANESTHESIA EVENT (OUTPATIENT)
Dept: GASTROENTEROLOGY | Facility: CLINIC | Age: 63
End: 2023-04-12
Payer: COMMERCIAL

## 2023-04-12 ASSESSMENT — LIFESTYLE VARIABLES: TOBACCO_USE: 1

## 2023-04-12 NOTE — ANESTHESIA PREPROCEDURE EVALUATION
Anesthesia Pre-Procedure Evaluation    Patient: Cristopher Cuellar   MRN: [id number] : 1960        Procedure : Procedure(s):  Colonoscopy          Past Medical History:   Diagnosis Date     Allergic state      NO ACTIVE PROBLEMS       Past Surgical History:   Procedure Laterality Date     COLONOSCOPY  3/1/2013    Procedure: COLONOSCOPY;  colonoscopy;  Surgeon: Emani Mckee MD;  Location: [facility]     KNEE SURGERY      Medial meniscus tear, left     NOSE SURGERY      Repair broken nose     ORTHOPEDIC SURGERY  2019      Allergies   Allergen Reactions     Pcn [Penicillins]       Social History     Tobacco Use     Smoking status: Former     Packs/day: 0.00     Years: 0.00     Pack years: 0.00     Types: Cigars, Cigarettes     Quit date: 2014     Years since quittin.4     Smokeless tobacco: Never     Tobacco comments:     1 cigar per day during the summer   Vaping Use     Vaping status: Not on file   Substance Use Topics     Alcohol use: No      Wt Readings from Last 1 Encounters:   23 100.8 kg (222 lb 3.2 oz)        Anesthesia Evaluation   Pt has had prior anesthetic. Type: MAC and General.        ROS/MED HX  ENT/Pulmonary:     (+) allergic rhinitis, tobacco use, Past use,     Neurologic:       Cardiovascular:     (+) -----Previous cardiac testing   Echo: Date: Results:    Stress Test: Date: Results:    ECG Reviewed: Date:  Results:  Sinus Rhythm   WITHIN NORMAL LIMITS  Cath: Date: Results:      METS/Exercise Tolerance:     Hematologic:     (+) History of blood clots,     Musculoskeletal:       GI/Hepatic:       Renal/Genitourinary:       Endo:     (+) Obesity,     Psychiatric/Substance Use:       Infectious Disease:       Malignancy:       Other:            Physical Exam    Airway  airway exam normal      Mallampati: II   TM distance: > 3 FB   Neck ROM: full   Mouth opening: > 3 cm    Respiratory Devices and Support         Dental       (+) Minor Abnormalities - some fillings,  tiny chips      Cardiovascular   cardiovascular exam normal          Pulmonary   pulmonary exam normal                OUTSIDE LABS:  CBC:   Lab Results   Component Value Date    WBC 6.2 09/11/2020    WBC 11.1 (H) 05/20/2020    HGB 17.1 09/11/2020    HGB 15.7 05/20/2020    HCT 50.1 09/11/2020    HCT 46.2 05/20/2020     09/11/2020     05/20/2020     BMP:   Lab Results   Component Value Date     09/11/2020     05/20/2020    POTASSIUM 4.0 09/11/2020    POTASSIUM 3.4 05/20/2020    CHLORIDE 102 09/11/2020    CHLORIDE 104 05/20/2020    CO2 33 (H) 09/11/2020    CO2 27 05/20/2020    BUN 14 09/11/2020    BUN 17 05/20/2020    CR 1.09 09/11/2020    CR 0.94 05/20/2020    GLC 87 11/22/2021    GLC 96 09/11/2020     COAGS:   Lab Results   Component Value Date    PTT 37 05/20/2020    INR 1.12 05/20/2020     POC: No results found for: BGM, HCG, HCGS  HEPATIC:   Lab Results   Component Value Date    ALBUMIN 3.7 09/11/2020    PROTTOTAL 7.7 09/11/2020    ALT 37 09/11/2020    AST 26 09/11/2020    GGT 26 01/11/2012    ALKPHOS 111 09/11/2020    BILITOTAL 0.6 09/11/2020     OTHER:   Lab Results   Component Value Date    A1C 5.20 01/11/2012    REHAN 9.3 09/11/2020    TSH 1.13 12/06/2018    CRP 61.2 (H) 05/20/2020    SED 7 05/21/2012       Anesthesia Plan    ASA Status:  2   NPO Status:  NPO Appropriate    Anesthesia Type: General.   Induction: Propofol, Intravenous.   Maintenance: TIVA.        Consents    Anesthesia Plan(s) and associated risks, benefits, and realistic alternatives discussed. Questions answered and patient/representative(s) expressed understanding.     - Discussed: Risks, Benefits and Alternatives for BOTH SEDATION and the PROCEDURE were discussed     - Discussed with:  Patient         Postoperative Care    Pain management: Multi-modal analgesia, IV analgesics, Oral pain medications.   PONV prophylaxis: Ondansetron (or other 5HT-3), Dexamethasone or Solumedrol, Background Propofol Infusion      Comments:                NURA Howe CRNA

## 2023-04-14 ENCOUNTER — HOSPITAL ENCOUNTER (OUTPATIENT)
Facility: CLINIC | Age: 63
Discharge: HOME | End: 2023-04-14
Attending: SURGERY | Admitting: SURGERY
Payer: COMMERCIAL

## 2023-04-14 ENCOUNTER — ANESTHESIA (OUTPATIENT)
Dept: GASTROENTEROLOGY | Facility: CLINIC | Age: 63
End: 2023-04-14
Payer: COMMERCIAL

## 2023-04-14 VITALS
BODY MASS INDEX: 31.78 KG/M2 | HEIGHT: 70 IN | RESPIRATION RATE: 16 BRPM | OXYGEN SATURATION: 97 % | HEART RATE: 66 BPM | WEIGHT: 222 LBS | SYSTOLIC BLOOD PRESSURE: 116 MMHG | DIASTOLIC BLOOD PRESSURE: 77 MMHG | TEMPERATURE: 98 F

## 2023-04-14 DIAGNOSIS — Z12.11 SPECIAL SCREENING FOR MALIGNANT NEOPLASMS, COLON: Primary | ICD-10-CM

## 2023-04-14 LAB — COLONOSCOPY: NORMAL

## 2023-04-14 PROCEDURE — 250N000009 HC RX 250: Performed by: SURGERY

## 2023-04-14 PROCEDURE — 250N000011 HC RX IP 250 OP 636: Performed by: NURSE ANESTHETIST, CERTIFIED REGISTERED

## 2023-04-14 PROCEDURE — 45385 COLONOSCOPY W/LESION REMOVAL: CPT | Mod: PT | Performed by: SURGERY

## 2023-04-14 PROCEDURE — 88305 TISSUE EXAM BY PATHOLOGIST: CPT | Mod: 26 | Performed by: PATHOLOGY

## 2023-04-14 PROCEDURE — 45385 COLONOSCOPY W/LESION REMOVAL: CPT | Performed by: SURGERY

## 2023-04-14 PROCEDURE — 370N000017 HC ANESTHESIA TECHNICAL FEE, PER MIN: Performed by: SURGERY

## 2023-04-14 PROCEDURE — 250N000009 HC RX 250: Performed by: NURSE ANESTHETIST, CERTIFIED REGISTERED

## 2023-04-14 PROCEDURE — 258N000003 HC RX IP 258 OP 636: Performed by: SURGERY

## 2023-04-14 PROCEDURE — 88305 TISSUE EXAM BY PATHOLOGIST: CPT | Mod: TC | Performed by: SURGERY

## 2023-04-14 RX ORDER — LIDOCAINE HYDROCHLORIDE 10 MG/ML
INJECTION, SOLUTION INFILTRATION; PERINEURAL PRN
Status: DISCONTINUED | OUTPATIENT
Start: 2023-04-14 | End: 2023-04-14

## 2023-04-14 RX ORDER — ONDANSETRON 2 MG/ML
4 INJECTION INTRAMUSCULAR; INTRAVENOUS EVERY 30 MIN PRN
Status: DISCONTINUED | OUTPATIENT
Start: 2023-04-14 | End: 2023-04-14 | Stop reason: HOSPADM

## 2023-04-14 RX ORDER — SODIUM CHLORIDE, SODIUM LACTATE, POTASSIUM CHLORIDE, CALCIUM CHLORIDE 600; 310; 30; 20 MG/100ML; MG/100ML; MG/100ML; MG/100ML
INJECTION, SOLUTION INTRAVENOUS CONTINUOUS
Status: DISCONTINUED | OUTPATIENT
Start: 2023-04-14 | End: 2023-04-14 | Stop reason: HOSPADM

## 2023-04-14 RX ORDER — VIT C/B6/B5/MAGNESIUM/HERB 173 50-5-6-5MG
500 CAPSULE ORAL DAILY
COMMUNITY

## 2023-04-14 RX ORDER — ALBUTEROL SULFATE 0.83 MG/ML
2.5 SOLUTION RESPIRATORY (INHALATION) EVERY 4 HOURS PRN
Status: DISCONTINUED | OUTPATIENT
Start: 2023-04-14 | End: 2023-04-14 | Stop reason: HOSPADM

## 2023-04-14 RX ORDER — GLYCOPYRROLATE 0.2 MG/ML
INJECTION, SOLUTION INTRAMUSCULAR; INTRAVENOUS PRN
Status: DISCONTINUED | OUTPATIENT
Start: 2023-04-14 | End: 2023-04-14

## 2023-04-14 RX ORDER — PROPOFOL 10 MG/ML
INJECTION, EMULSION INTRAVENOUS CONTINUOUS PRN
Status: DISCONTINUED | OUTPATIENT
Start: 2023-04-14 | End: 2023-04-14

## 2023-04-14 RX ORDER — ONDANSETRON 4 MG/1
4 TABLET, ORALLY DISINTEGRATING ORAL EVERY 30 MIN PRN
Status: DISCONTINUED | OUTPATIENT
Start: 2023-04-14 | End: 2023-04-14 | Stop reason: HOSPADM

## 2023-04-14 RX ORDER — PROPOFOL 10 MG/ML
INJECTION, EMULSION INTRAVENOUS PRN
Status: DISCONTINUED | OUTPATIENT
Start: 2023-04-14 | End: 2023-04-14

## 2023-04-14 RX ORDER — LIDOCAINE 40 MG/G
CREAM TOPICAL
Status: DISCONTINUED | OUTPATIENT
Start: 2023-04-14 | End: 2023-04-14 | Stop reason: HOSPADM

## 2023-04-14 RX ADMIN — SODIUM CHLORIDE, POTASSIUM CHLORIDE, SODIUM LACTATE AND CALCIUM CHLORIDE: 600; 310; 30; 20 INJECTION, SOLUTION INTRAVENOUS at 10:33

## 2023-04-14 RX ADMIN — GLYCOPYRROLATE 0.1 MG: 0.2 INJECTION, SOLUTION INTRAMUSCULAR; INTRAVENOUS at 10:47

## 2023-04-14 RX ADMIN — PROPOFOL 100 MG: 10 INJECTION, EMULSION INTRAVENOUS at 10:37

## 2023-04-14 RX ADMIN — PROPOFOL 150 MCG/KG/MIN: 10 INJECTION, EMULSION INTRAVENOUS at 10:37

## 2023-04-14 RX ADMIN — GLYCOPYRROLATE 0.1 MG: 0.2 INJECTION, SOLUTION INTRAMUSCULAR; INTRAVENOUS at 10:37

## 2023-04-14 RX ADMIN — LIDOCAINE HYDROCHLORIDE 0.2 ML: 10 INJECTION, SOLUTION EPIDURAL; INFILTRATION; INTRACAUDAL; PERINEURAL at 10:34

## 2023-04-14 RX ADMIN — LIDOCAINE HYDROCHLORIDE 100 MG: 10 INJECTION, SOLUTION INFILTRATION; PERINEURAL at 10:37

## 2023-04-14 ASSESSMENT — ACTIVITIES OF DAILY LIVING (ADL): ADLS_ACUITY_SCORE: 35

## 2023-04-14 NOTE — ANESTHESIA CARE TRANSFER NOTE
Patient: Cristopher Cuellar    Procedure: Procedure(s):  COLONOSCOPY, FLEXIBLE, WITH LESION REMOVAL USING SNARE       Diagnosis: Encounter for colorectal cancer screening [Z12.11, Z12.12]  Diagnosis Additional Information: No value filed.    Anesthesia Type:   General     Note:    Oropharynx: oropharynx clear of all foreign objects and spontaneously breathing  Level of Consciousness: awake  Oxygen Supplementation: room air    Independent Airway: airway patency satisfactory and stable  Dentition: dentition unchanged  Vital Signs Stable: post-procedure vital signs reviewed and stable  Report to RN Given: handoff report given  Patient transferred to: Phase II    Handoff Report: Identifed the Patient, Identified the Reponsible Provider, Reviewed the pertinent medical history, Discussed the surgical course, Reviewed Intra-OP anesthesia mangement and issues during anesthesia, Set expectations for post-procedure period and Allowed opportunity for questions and acknowledgement of understanding      Vitals:  Vitals Value Taken Time   /76 04/14/23 1055   Temp 36.8  C (98.3  F) 04/14/23 1055   Pulse 69 04/14/23 1055   Resp 14 04/14/23 1055   SpO2 94 % 04/14/23 1056   Vitals shown include unvalidated device data.    Electronically Signed By: NURA Castro CRNA  April 14, 2023  10:58 AM

## 2023-04-14 NOTE — LETTER
Cristopher Cuellar  [address] [place] [id number]    April 20, 2023    Dear Cristopher,  This letter is written to inform you of the results of your recent colonoscopy.  Your examination showed polyp(s) in your rectum. All polyps were removed in their entirety and sent for review by a pathologist. As you will see on the pathology report below, the tissue(s) were polyps consistent with sessile serrated adenoma. Your examination was otherwise without abnormality.    Final Diagnosis   Large intestine, rectum, polyp, biopsy/polypectomy:  - Sessile serrated adenoma(s) negative for cytological dysplasia.       Adenomatous polyps are entirely benign (non-cancerous); however, patients who have developed these polyps are at an increased risk for developing additional polyps in the future. If these are not eventually removed, there is a risk of developing colon cancer. In particular, sessile serrated adenomas are associated with increased risk of malignant transformation. We will advise more frequent examinations with you because of the risk associated with this type of polyp.    Given these findings,  I recommend that you undergo a repeat colonoscopy in 5 year(s) for surveillance. We will enter you into a recall system so you receive a reminder closer to the time that you are due for repeat examination.     Please remember that this recommendation is made with the understanding that you are not experiencing persistent changes in bowel function, bleeding per rectum, and/or significant abdominal pain. If you experience these symptoms, please contact your primary care provider for a further evaluation.     If you have any questions or concerns about the results of your colonoscopy or the appropriate follow-up, please contact my assistant at [phone].          Sincerely,        [facility],   [facility]  ___

## 2023-04-14 NOTE — INTERVAL H&P NOTE
"I have reviewed the surgical (or preoperative) H&P that is linked to this encounter, and examined the patient. There are no significant changes    last colon 2013 (nl);; no blood thinner; no famhx of colon cancer    Clinical Conditions Present on Arrival:  Clinically Significant Risk Factors Present on Admission                  # Obesity: Estimated body mass index is 31.85 kg/m  as calculated from the following:    Height as of this encounter: 1.778 m (5' 10\").    Weight as of this encounter: 100.7 kg (222 lb).       "

## 2023-04-14 NOTE — ANESTHESIA POSTPROCEDURE EVALUATION
Patient: Cristopher Cuellar    Procedure: Procedure(s):  COLONOSCOPY, FLEXIBLE, WITH LESION REMOVAL USING SNARE       Anesthesia Type:  General    Note:  Disposition: Outpatient   Postop Pain Control: Uneventful            Sign Out: Well controlled pain   PONV: No   Neuro/Psych: Uneventful            Sign Out: Acceptable/Baseline neuro status   Airway/Respiratory: Uneventful            Sign Out: Acceptable/Baseline resp. status   CV/Hemodynamics: Uneventful            Sign Out: Acceptable CV status; No obvious hypovolemia; No obvious fluid overload   Other NRE: NONE   DID A NON-ROUTINE EVENT OCCUR? No           Last vitals:  Vitals Value Taken Time   /76 04/14/23 1055   Temp 36.8  C (98.3  F) 04/14/23 1055   Pulse 69 04/14/23 1055   Resp 14 04/14/23 1055   SpO2 93 % 04/14/23 1057   Vitals shown include unvalidated device data.    Electronically Signed By: NURA Castro CRNA  April 14, 2023  10:58 AM

## 2023-04-17 LAB
PATH REPORT.COMMENTS IMP SPEC: NORMAL
PATH REPORT.COMMENTS IMP SPEC: NORMAL
PATH REPORT.FINAL DX SPEC: NORMAL
PATH REPORT.GROSS SPEC: NORMAL
PATH REPORT.MICROSCOPIC SPEC OTHER STN: NORMAL
PATH REPORT.RELEVANT HX SPEC: NORMAL
PHOTO IMAGE: NORMAL

## 2023-05-25 ENCOUNTER — TRANSFERRED RECORDS (OUTPATIENT)
Dept: HEALTH INFORMATION MANAGEMENT | Facility: CLINIC | Age: 63
End: 2023-05-25
Payer: COMMERCIAL

## 2023-06-06 ENCOUNTER — TRANSFERRED RECORDS (OUTPATIENT)
Dept: HEALTH INFORMATION MANAGEMENT | Facility: CLINIC | Age: 63
End: 2023-06-06

## 2023-06-24 NOTE — PATIENT INSTRUCTIONS
Keep working with chiro on neck  For wrists  Wear wrist splints cock up at nightt.  See Dr. Anguiano [facility] ortho for trigger fingers [facility] [place] [phone] and carple tunnel and wrist pain.        Get pad for keyboard.   Preventive Health Recommendations  Male Ages 50 - 64    Yearly exam:             See your health care provider every year in order to  o   Review health changes.   o   Discuss preventive care.    o   Review your medicines if your doctor has prescribed any.     Have a cholesterol test every 5 years, or more frequently if you are at risk for high cholesterol/heart disease.     Have a diabetes test (fasting glucose) every three years. If you are at risk for diabetes, you should have this test more often.     Have a colonoscopy at age 50, or have a yearly FIT test (stool test). These exams will check for colon cancer.      Talk with your health care provider about whether or not a prostate cancer screening test (PSA) is right for you.    You should be tested each year for STDs (sexually transmitted diseases), if you re at risk.     Shots: Get a flu shot each year. Get a tetanus shot every 10 years.     Nutrition:    Eat at least 5 servings of fruits and vegetables daily.     Eat whole-grain bread, whole-wheat pasta and brown rice instead of white grains and rice.     Get adequate Calcium and Vitamin D.     Lifestyle    Exercise for at least 150 minutes a week (30 minutes a day, 5 days a week). This will help you control your weight and prevent disease.     Limit alcohol to one drink per day.     No smoking.     Wear sunscreen to prevent skin cancer.     See your dentist every six months for an exam and cleaning.     See your eye doctor every 1 to 2 years.     53

## 2023-07-10 ENCOUNTER — TRANSFERRED RECORDS (OUTPATIENT)
Dept: HEALTH INFORMATION MANAGEMENT | Facility: CLINIC | Age: 63
End: 2023-07-10
Payer: COMMERCIAL

## 2023-07-12 ENCOUNTER — TRANSFERRED RECORDS (OUTPATIENT)
Dept: HEALTH INFORMATION MANAGEMENT | Facility: CLINIC | Age: 63
End: 2023-07-12
Payer: COMMERCIAL

## 2023-09-11 ENCOUNTER — OFFICE VISIT (OUTPATIENT)
Dept: FAMILY MEDICINE | Facility: CLINIC | Age: 63
End: 2023-09-11
Payer: COMMERCIAL

## 2023-09-11 VITALS
HEART RATE: 67 BPM | SYSTOLIC BLOOD PRESSURE: 126 MMHG | RESPIRATION RATE: 16 BRPM | TEMPERATURE: 97 F | WEIGHT: 199.7 LBS | HEIGHT: 70 IN | BODY MASS INDEX: 28.59 KG/M2 | OXYGEN SATURATION: 96 % | DIASTOLIC BLOOD PRESSURE: 72 MMHG

## 2023-09-11 DIAGNOSIS — Z23 NEED FOR PROPHYLACTIC VACCINATION AND INOCULATION AGAINST INFLUENZA: ICD-10-CM

## 2023-09-11 DIAGNOSIS — N52.03 COMBINED ARTERIAL INSUFFICIENCY AND CORPORO-VENOUS OCCLUSIVE ERECTILE DYSFUNCTION: ICD-10-CM

## 2023-09-11 DIAGNOSIS — K62.5 BRBPR (BRIGHT RED BLOOD PER RECTUM): Primary | ICD-10-CM

## 2023-09-11 LAB
ERYTHROCYTE [DISTWIDTH] IN BLOOD BY AUTOMATED COUNT: 11.5 % (ref 10–15)
HCT VFR BLD AUTO: 43.9 % (ref 40–53)
HGB BLD-MCNC: 15 G/DL (ref 13.3–17.7)
MCH RBC QN AUTO: 30.6 PG (ref 26.5–33)
MCHC RBC AUTO-ENTMCNC: 34.2 G/DL (ref 31.5–36.5)
MCV RBC AUTO: 90 FL (ref 78–100)
PLATELET # BLD AUTO: 231 10E3/UL (ref 150–450)
RBC # BLD AUTO: 4.9 10E6/UL (ref 4.4–5.9)
WBC # BLD AUTO: 6.7 10E3/UL (ref 4–11)

## 2023-09-11 PROCEDURE — 90682 RIV4 VACC RECOMBINANT DNA IM: CPT | Performed by: FAMILY MEDICINE

## 2023-09-11 PROCEDURE — 36415 COLL VENOUS BLD VENIPUNCTURE: CPT | Performed by: FAMILY MEDICINE

## 2023-09-11 PROCEDURE — 99213 OFFICE O/P EST LOW 20 MIN: CPT | Mod: 25 | Performed by: FAMILY MEDICINE

## 2023-09-11 PROCEDURE — 90471 IMMUNIZATION ADMIN: CPT | Performed by: FAMILY MEDICINE

## 2023-09-11 PROCEDURE — 85027 COMPLETE CBC AUTOMATED: CPT | Performed by: FAMILY MEDICINE

## 2023-09-11 ASSESSMENT — PAIN SCALES - GENERAL: PAINLEVEL: NO PAIN (0)

## 2023-09-11 NOTE — PROGRESS NOTES
"  Assessment & Plan     BRBPR (bright red blood per rectum)  Appears was internal hemmrhoid as seen on anoscopy, has external also.  Likely only symtpmatic as he was doing heavy backpacking and hard firm stools.  He will pack more dried fruit in the furute.  - CBC with platelets; Future  - CBC with platelets  Have colonoscopies every 5 years as ordered.  Combined arterial insufficiency and corporo-venous occlusive erectile dysfunction    - sildenafil (VIAGRA) 100 MG tablet; Take 1 tablet (100 mg) by mouth daily as needed    Need for prophylactic vaccination and inoculation against influenza           BMI:   Estimated body mass index is 28.65 kg/m  as calculated from the following:    Height as of this encounter: 1.778 m (5' 10\").    Weight as of this encounter: 90.6 kg (199 lb 11.2 oz).   Weight management plan: Discussed healthy diet and exercise guidelines        Zafar Fragoso MD  [facility] MARSHA Kate is a 62 year old, presenting for the following health issues:  Bowel Problems      9/11/2023     4:39 PM   Additional Questions   Roomed by Parul Cantor CMA       History of Present Illness       Reason for visit:  Blood in my stool.  Symptom onset:  3-7 days ago  Symptoms include:  Blood in my stool on Sept 8th and 9th. Ongoing discomfort with bowel movements.  Symptom intensity:  Mild  Symptom progression:  Staying the same  Had these symptoms before:  Yes  Has tried/received treatment for these symptoms:  No  What makes it worse:  No  What makes it better:  No    He eats 2-3 servings of fruits and vegetables daily.He consumes 1 sweetened beverage(s) daily.He exercises with enough effort to increase his heart rate 30 to 60 minutes per day.  He exercises with enough effort to increase his heart rate 4 days per week.   He is taking medications regularly.       Review of Systems   Constitutional, HEENT, cardiovascular, pulmonary, gi and gu systems are negative, except as otherwise noted.    "   Objective    There were no vitals taken for this visit.  There is no height or weight on file to calculate BMI.  Physical Exam   GENERAL: healthy, alert and no distress  NECK: no adenopathy, no asymmetry, masses, or scars and thyroid normal to palpation  RESP: lungs clear to auscultation - no rales, rhonchi or wheezes  CV: regular rate and rhythm, normal S1 S2, no S3 or S4, no murmur, click or rub, no peripheral edema and peripheral pulses strong  ABDOMEN: soft, nontender, no hepatosplenomegaly, no masses and bowel sounds normal  MS: no gross musculoskeletal defects noted, no edema

## 2023-09-12 RX ORDER — SILDENAFIL 100 MG/1
100 TABLET, FILM COATED ORAL DAILY PRN
Qty: 6 TABLET | Refills: 11 | Status: SHIPPED | OUTPATIENT
Start: 2023-09-12 | End: 2024-02-04

## 2023-09-18 ENCOUNTER — TELEPHONE (OUTPATIENT)
Dept: FAMILY MEDICINE | Facility: CLINIC | Age: 63
End: 2023-09-18
Payer: COMMERCIAL

## 2023-09-18 NOTE — TELEPHONE ENCOUNTER
S-(situation): I talked with Humble.  He has questions about getting Paxlovid. Humble states on 9/11/23 he felt achy and tired and had cold like symptoms.  Tested positive for COVID on 9/12/23 and again on 9/15/23.  He is better since 9/11/23.  Has a runny nose and cough. No loss of taste or smell, nos shortness of breath or wheezing, or fever.    B-(background): medical history is no active problems.  Meds are mostly vitamins    A-(assessment): COVID +, home test,  sounds like getting better    R-(recommendations): advised quarantine 5 days, then mask 5 days.  Fluids, rest and good nutrition.  He decided not to get Paxlovis.  Shital Denis RN

## 2024-02-01 ENCOUNTER — TELEPHONE (OUTPATIENT)
Dept: FAMILY MEDICINE | Facility: CLINIC | Age: 64
End: 2024-02-01
Payer: COMMERCIAL

## 2024-02-01 DIAGNOSIS — N52.03 COMBINED ARTERIAL INSUFFICIENCY AND CORPORO-VENOUS OCCLUSIVE ERECTILE DYSFUNCTION: ICD-10-CM

## 2024-02-01 NOTE — TELEPHONE ENCOUNTER
Pt called and has a coupon for sildenafil (VIAGRA) 100 MG tablet     Wants to know if you could change his prescription to 30days for this month?    And send to [facility].

## 2024-02-04 RX ORDER — SILDENAFIL 100 MG/1
100 TABLET, FILM COATED ORAL DAILY PRN
Qty: 30 TABLET | Refills: 11 | Status: SHIPPED | OUTPATIENT
Start: 2024-02-04

## 2024-05-07 ENCOUNTER — TELEPHONE (OUTPATIENT)
Dept: FAMILY MEDICINE | Facility: CLINIC | Age: 64
End: 2024-05-07
Payer: COMMERCIAL

## 2024-05-07 NOTE — TELEPHONE ENCOUNTER
S-(situation): Humble is calling regarding a tick bite.     B-(background): Removed a embedded tick from his body    A-(assessment): the tick was embedded probably 24 hours, no more than 36 hours. He is sure he removed the whole tick. He states the tick was small. He does have a red area at the tick site now.     R-(recommendations): Wash with soap and water and can use bacitracin daily until healed. Informed there is no risk for transmission of Lyme Disease if the tick was removed before being attached for 36 hours. Watch for signs of infection: increased redness, swelling, red streaks, foul drainage, fever. Watch for other neurological symptoms and should be seen if develops bulls eye rash at the site, fever, headache, neck stiffness, muscle/joint aches.   Patient verbalized understanding.   Stephanie BENAVIDEZ RN

## 2024-05-09 ENCOUNTER — OFFICE VISIT (OUTPATIENT)
Dept: FAMILY MEDICINE | Facility: CLINIC | Age: 64
End: 2024-05-09
Payer: COMMERCIAL

## 2024-05-09 VITALS
DIASTOLIC BLOOD PRESSURE: 80 MMHG | HEIGHT: 70 IN | OXYGEN SATURATION: 96 % | TEMPERATURE: 97 F | RESPIRATION RATE: 12 BRPM | HEART RATE: 68 BPM | BODY MASS INDEX: 29.78 KG/M2 | WEIGHT: 208 LBS | SYSTOLIC BLOOD PRESSURE: 110 MMHG
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
## 2024-05-09 DIAGNOSIS — S80.862A TICK BITE OF LEFT LOWER LEG, INITIAL ENCOUNTER: Primary | ICD-10-CM

## 2024-05-09 PROCEDURE — 99213 OFFICE O/P EST LOW 20 MIN: CPT | Performed by: INTERNAL MEDICINE

## 2024-05-09 RX ORDER — DOXYCYCLINE 100 MG/1
200 CAPSULE ORAL ONCE
Qty: 2 CAPSULE | Refills: 0 | Status: SHIPPED | OUTPATIENT
Start: 2024-05-09 | End: 2024-05-09

## 2024-05-09 ASSESSMENT — PAIN SCALES - GENERAL: PAINLEVEL: NO PAIN (0)

## 2024-05-09 NOTE — PATIENT INSTRUCTIONS
Will start Doxycycline 200 mg x 1 dose  Symptoms of lyme - bullseye rash - may not be at tick bite site, body aches, fevers/chills, fatigue.  Should not have 'cold like' symptoms or 'stomach flu' like symptoms  Redness at tick bite site is common and not indicative of lyme disease

## 2024-05-09 NOTE — PROGRESS NOTES
Assessment & Plan     Tick bite of left lower leg, initial encounter  He is a few hours outside the 72 hr window for lyme prophylactic, but benefits > risk.  Educated about signs/symptoms of lyme  - doxycycline hyclate (VIBRAMYCIN) 100 MG capsule; Take 2 capsules (200 mg) by mouth once for 1 dose        Patient Instructions   Will start Doxycycline 200 mg x 1 dose  Symptoms of lyme - bullseye rash - may not be at tick bite site, body aches, fevers/chills, fatigue.  Should not have 'cold like' symptoms or 'stomach flu' like symptoms  Redness at tick bite site is common and not indicative of lyme disease    Subjective   Humble is a 63 year old, presenting for the following health issues:  Insect Bites (Deer tick was on from 48-68 hrs on believe, lower left leg, redness, bright dark  red , not warm to touch, red ring ring around the bite, no pain, this was imbedded into the skin , //Was camping on the tall grass with the horses )        5/9/2024     7:44 AM   Additional Questions   Roomed by iman   Accompanied by self         5/9/2024     7:44 AM   Patient Reported Additional Medications   Patient reports taking the following new medications none     History of Present Illness       Reason for visit:  Deertick bite    He eats 2-3 servings of fruits and vegetables daily.He consumes 1 sweetened beverage(s) daily.He exercises with enough effort to increase his heart rate 20 to 29 minutes per day.  He exercises with enough effort to increase his heart rate 4 days per week.   He is taking medications regularly.       Chief Complaint   Patient presents with    Insect Bites     Deer tick was on from 48-68 hrs on believe, lower left leg, redness, bright dark  red , not warm to touch, red ring ring around the bite, no pain, this was imbedded into the skin ,     Was camping on the tall grass with the horses        Tick bite sometime this past weekend, from 5/3-5/5  Tick was removed 5/5 pm; tick was not engorged;  Shows picture  "of the tick bite - typical erythema.  Tick was not engorged. He brings tick, it is a deer tick    Current Outpatient Medications   Medication Sig Dispense Refill    Ascorbic Acid (VITAMIN C PO) Take 1 tablet by mouth daily      Cholecalciferol (VITAMIN D3) 1000 UNIT TABS Take 2,000 mg by mouth daily.      doxylamine (UNISOM) 25 MG TABS tablet Take 12.5 mg by mouth At Bedtime      fluticasone (FLONASE) 50 MCG/ACT spray Spray 1-2 sprays into both nostrils daily 1 Bottle 11    Omega-3 Fatty Acids (FISH OIL PO) Take by mouth daily      psyllium (METAMUCIL) 28.3 % packet Take 1 packet by mouth daily      sildenafil (VIAGRA) 100 MG tablet Take 1 tablet (100 mg) by mouth daily as needed (1 tab up to daily as needed. 1-4 hours before sex.) 30 tablet 11    Turmeric (CURCUMIN 95) 500 MG CAPS Take 500 mg by mouth daily      bisacodyl (DULCOLAX) 5 MG EC tablet Take 2 tablets at 3 pm the day before your procedure. If your procedure is before 11 am, take 2 additional tablets at 11 pm. If your procedure is after 11 am, take 2 additional tablets at 6 am. For additional instructions refer to your colonoscopy prep instructions. 4 tablet 0    polyethylene glycol (GOLYTELY) 236 g suspension The night before the exam at 6 pm drink an 8-ounce glass every 15 minutes until the jug is half empty. If you arrive before 11 AM: Drink the other half of the Golytely jug at 11 PM night before procedure. If you arrive after 11 AM: Drink the other half of the Golytely jug at 6 AM day of procedure. For additional instructions refer to your colonoscopy prep instructions. 4000 mL 0           Review of Systems  Constitutional, HEENT, cardiovascular, pulmonary, gi and gu systems are negative, except as otherwise noted.      Objective    /80 (BP Location: Right arm, Patient Position: Sitting, Cuff Size: Adult Regular)   Pulse 68   Temp 97  F (36.1  C) (Tympanic)   Resp 12   Ht 1.778 m (5' 10\")   Wt 94.3 kg (208 lb)   SpO2 96%   BMI 29.84 " kg/m    Body mass index is 29.84 kg/m .  Physical Exam   GENERAL: alert and no distress  SKIN: left lower posterior leg there is an erythematous tick bite with central necrosis            Signed Electronically by: Karo Stark DO

## 2024-06-01 ENCOUNTER — HEALTH MAINTENANCE LETTER (OUTPATIENT)
Age: 64
End: 2024-06-01

## 2024-08-21 ENCOUNTER — OFFICE VISIT (OUTPATIENT)
Dept: FAMILY MEDICINE | Facility: CLINIC | Age: 64
End: 2024-08-21
Payer: COMMERCIAL

## 2024-08-21 VITALS
RESPIRATION RATE: 14 BRPM | OXYGEN SATURATION: 96 % | WEIGHT: 204.3 LBS | DIASTOLIC BLOOD PRESSURE: 74 MMHG | HEART RATE: 64 BPM | BODY MASS INDEX: 29.25 KG/M2 | TEMPERATURE: 97.7 F | SYSTOLIC BLOOD PRESSURE: 118 MMHG | HEIGHT: 70 IN

## 2024-08-21 DIAGNOSIS — I26.99 ACUTE PULMONARY EMBOLISM WITHOUT ACUTE COR PULMONALE, UNSPECIFIED PULMONARY EMBOLISM TYPE (H): ICD-10-CM

## 2024-08-21 DIAGNOSIS — Z29.11 NEED FOR VACCINATION AGAINST RESPIRATORY SYNCYTIAL VIRUS: ICD-10-CM

## 2024-08-21 DIAGNOSIS — Z82.49 FH: AORTIC ANEURYSM: ICD-10-CM

## 2024-08-21 DIAGNOSIS — Z00.00 ENCOUNTER FOR MEDICARE ANNUAL WELLNESS EXAM: Primary | ICD-10-CM

## 2024-08-21 DIAGNOSIS — Z12.5 SCREENING FOR PROSTATE CANCER: ICD-10-CM

## 2024-08-21 PROCEDURE — 90677 PCV20 VACCINE IM: CPT | Performed by: FAMILY MEDICINE

## 2024-08-21 PROCEDURE — 99213 OFFICE O/P EST LOW 20 MIN: CPT | Mod: 25 | Performed by: FAMILY MEDICINE

## 2024-08-21 PROCEDURE — 93000 ELECTROCARDIOGRAM COMPLETE: CPT | Mod: 59 | Performed by: FAMILY MEDICINE

## 2024-08-21 PROCEDURE — G0009 ADMIN PNEUMOCOCCAL VACCINE: HCPCS | Performed by: FAMILY MEDICINE

## 2024-08-21 PROCEDURE — G0402 INITIAL PREVENTIVE EXAM: HCPCS | Performed by: FAMILY MEDICINE

## 2024-08-21 ASSESSMENT — PAIN SCALES - GENERAL: PAINLEVEL: NO PAIN (0)

## 2024-08-21 NOTE — PROGRESS NOTES
"Preventive Care Visit  [facility] MARSHA Fragoso MD, Family Practice  Aug 21, 2024      Assessment & Plan     Encounter for Medicare annual wellness exam    - EKG 12-lead complete w/read - Clinics    History of pulmonary embolism without acute cor pulmonale, unspecified pulmonary embolism type (H)  Completed 6 months anticoagulation.  Had negative d dimer for follow up.   Plan: CBC with platelets      (Z82.49) FH: aortic aneurysm  Plan: [place] Aorta Medicare AAA Screening    (Z12.5) Screening for prostate cancer  Plan: PSA, screen  Patient has been advised of split billing requirements and indicates understanding: Yes  BMI  Estimated body mass index is 29.31 kg/m  as calculated from the following:    Height as of this encounter: 1.778 m (5' 10\").    Weight as of this encounter: 92.7 kg (204 lb 4.8 oz).   Weight management plan: Discussed healthy diet and exercise guidelines    Counseling  Appropriate preventive services were addressed with this patient via screening, questionnaire, or discussion as appropriate for fall prevention, nutrition, physical activity, Tobacco-use cessation, social engagement, weight loss and cognition.  Checklist reviewing preventive services available has been given to the patient.  Reviewed patient's diet, addressing concerns and/or questions.   He is at risk for lack of exercise and has been provided with information to increase physical activity for the benefit of his well-being.   The patient was provided with written information regarding signs of hearing loss.     Subjective   Humble is a 63 year old, presenting for the following:  Medicare Visit        8/21/2024     3:34 PM   Additional Questions   Roomed by Parul Angel CMA        Health Care Directive  Patient does not have a Health Care Directive or Living Will: Patient states has Advance Directive and will bring in a copy to clinic.    HPI    -Has family history of aneurysms and wants to discuss this.        " 8/16/2024   General Health   How would you rate your overall physical health? Good   Feel stress (tense, anxious, or unable to sleep) Not at all          8/16/2024   Nutrition   Diet: Regular (no restrictions)            8/16/2024   Exercise   Days per week of moderate/strenous exercise 3 days   Average minutes spent exercising at this level 40 min            8/16/2024   Social Factors   Frequency of gathering with friends or relatives Twice a week   Worry food won't last until get money to buy more No   Food not last or not have enough money for food? No   Do you have housing? (Housing is defined as stable permanent housing and does not include staying ouside in a car, in a tent, in an abandoned building, in an overnight shelter, or couch-surfing.) Yes   Are you worried about losing your housing? No   Lack of transportation? No   Unable to get utilities (heat,electricity)? No            8/16/2024   Fall Risk   Fallen 2 or more times in the past year? No   Trouble with walking or balance? No             8/16/2024   Activities of Daily Living- Home Safety   Needs help with the following daily activites None of the above   Safety concerns in the home None of the above            8/16/2024   Dental   Dentist two times every year? Yes            8/16/2024   Hearing Screening   Hearing concerns? (!) IT'S HARD TO FOLLOW A CONVERSATION IN A NOISY RESTAURANT OR CROWDED ROOM.            8/16/2024   Driving Risk Screening   Patient/family members have concerns about driving No            8/16/2024   General Alertness/Fatigue Screening   Have you been more tired than usual lately? No            8/16/2024   Urinary Incontinence Screening   Bothered by leaking urine in past 6 months No            8/16/2024   TB Screening   Were you born outside of the [place]? No            Today's PHQ-2 Score:       8/21/2024     3:33 PM   PHQ-2 ( 1999 Pf[employer])   Q1: Little interest or pleasure in doing things 0   Q2: Feeling down, depressed or  hopeless 0   PHQ-2 Score 0   Q1: Little interest or pleasure in doing things Not at all   Q2: Feeling down, depressed or hopeless Not at all   PHQ-2 Score 0           2024   Substance Use   Alcohol more than 3/day or more than 7/wk No   Do you have a current opioid prescription? No   How severe/bad is pain from 1 to 10? 3/10   Do you use any other substances recreationally? No        Social History     Tobacco Use    Smoking status: Former     Current packs/day: 0.00     Types: Cigars, Cigarettes     Quit date: 2014     Years since quittin.8    Smokeless tobacco: Never    Tobacco comments:     1 cigar per day during the summer   Substance Use Topics    Alcohol use: No    Drug use: No       Last PSA:   PSA   Date Value Ref Range Status   2020 1.42 0 - 4 ug/L Final     Comment:     Assay Method:  Chemiluminescence using Siemens Vista analyzer     Prostate Specific Antigen Screen   Date Value Ref Range Status   2023 1.23 0.00 - 4.50 ng/mL Final     ASCVD Risk   The 10-year ASCVD risk score (Sj DK, et al., 2019) is: 11.1%    Values used to calculate the score:      Age: 63 years      Sex: Male      Is Non- : No      Diabetic: No      Tobacco smoker: No      Systolic Blood Pressure: 118 mmHg      Is BP treated: No      HDL Cholesterol: 42 mg/dL      Total Cholesterol: 172 mg/dL          Reviewed and updated as needed this visit by Provider                  Current providers sharing in care for this patient include:  Patient Care Team:  Zafar Fragoso MD as PCP - General (Family Practice)  Zafar Fragoso MD as Assigned PCP  Nate Cole MD as MD (Otolaryngology)    The following health maintenance items are reviewed in Epic and correct as of today:  Health Maintenance   Topic Date Due    RSV VACCINE (Pregnancy & 60+) (1 - 1-dose 60+ series) Never done    COVID-19 Vaccine (2023-24 season) 2023    Pneumococcal Vaccine: 65+ Years (1 of 1 - PCV)  "Never done    AORTIC ANEURYSM SCREENING (SYSTEM ASSIGNED)  Never done    LUNG CANCER SCREENING  04/05/2024    INFLUENZA VACCINE (1) 09/01/2024    GLUCOSE  11/22/2024    MEDICARE ANNUAL WELLNESS VISIT  08/21/2025    ANNUAL REVIEW OF HM ORDERS  08/21/2025    FALL RISK ASSESSMENT  08/21/2025    DTAP/TDAP/TD IMMUNIZATION (3 - Td or Tdap) 06/02/2026    LIPID  12/08/2026    COLORECTAL CANCER SCREENING  04/14/2028    ADVANCE CARE PLANNING  08/21/2029    HEPATITIS C SCREENING  Completed    HIV SCREENING  Completed    PHQ-2 (once per calendar year)  Completed    ZOSTER IMMUNIZATION  Completed    HPV IMMUNIZATION  Aged Out    MENINGITIS IMMUNIZATION  Aged Out    RSV MONOCLONAL ANTIBODY  Aged Out     Review of Systems  Constitutional, HEENT, cardiovascular, pulmonary, gi and gu systems are negative, except as otherwise noted.     Objective    Exam  /74   Pulse 64   Temp 97.7  F (36.5  C) (Tympanic)   Resp 14   Ht 1.778 m (5' 10\")   Wt 92.7 kg (204 lb 4.8 oz)   SpO2 96%   BMI 29.31 kg/m     Estimated body mass index is 29.31 kg/m  as calculated from the following:    Height as of this encounter: 1.778 m (5' 10\").    Weight as of this encounter: 92.7 kg (204 lb 4.8 oz).    Physical Exam  GENERAL: alert and no distress  NECK: no adenopathy, no asymmetry, masses, or scars  RESP: lungs clear to auscultation - no rales, rhonchi or wheezes  CV: regular rate and rhythm, normal S1 S2, no S3 or S4, no murmur, click or rub, no peripheral edema  ABDOMEN: soft, nontender, no hepatosplenomegaly, no masses and bowel sounds normal  MS: no gross musculoskeletal defects noted, no edema         8/21/2024   Mini Cog   Clock Draw Score 2 Normal   3 Item Recall 3 objects recalled   Mini Cog Total Score 5            Vision Screen  Vision Screen Results: Pass      Signed Electronically by: Zafar Fragoso MD    "

## 2024-08-21 NOTE — PATIENT INSTRUCTIONS
Patient Education   Preventive Care Advice   This is general advice given by our system to help you stay healthy. However, your care team may have specific advice just for you. Please talk to your care team about your preventive care needs.  Nutrition  Eat 5 or more servings of fruits and vegetables each day.  Try wheat bread, brown rice and whole grain pasta (instead of white bread, rice, and pasta).  Get enough calcium and vitamin D. Check the label on foods and aim for 100% of the RDA (recommended daily allowance).  Lifestyle  Exercise at least 150 minutes each week  (30 minutes a day, 5 days a week).  Do muscle strengthening activities 2 days a week. These help control your weight and prevent disease.  No smoking.  Wear sunscreen to prevent skin cancer.  Have a dental exam and cleaning every 6 months.  Yearly exams  See your health care team every year to talk about:  Any changes in your health.  Any medicines your care team has prescribed.  Preventive care, family planning, and ways to prevent chronic diseases.  Shots (vaccines)   HPV shots (up to age 26), if you've never had them before.  Hepatitis B shots (up to age 59), if you've never had them before.  COVID-19 shot: Get this shot when it's due.  Flu shot: Get a flu shot every year.  Tetanus shot: Get a tetanus shot every 10 years.  Pneumococcal, hepatitis A, and RSV shots: Ask your care team if you need these based on your risk.  Shingles shot (for age 50 and up)  General health tests  Diabetes screening:  Starting at age 35, Get screened for diabetes at least every 3 years.  If you are younger than age 35, ask your care team if you should be screened for diabetes.  Cholesterol test: At age 39, start having a cholesterol test every 5 years, or more often if advised.  Bone density scan (DEXA): At age 50, ask your care team if you should have this scan for osteoporosis (brittle bones).  Hepatitis C: Get tested at least once in your life.  STIs (sexually  transmitted infections)  Before age 24: Ask your care team if you should be screened for STIs.  After age 24: Get screened for STIs if you're at risk. You are at risk for STIs (including HIV) if:  You are sexually active with more than one person.  You don't use condoms every time.  You or a partner was diagnosed with a sexually transmitted infection.  If you are at risk for HIV, ask about PrEP medicine to prevent HIV.  Get tested for HIV at least once in your life, whether you are at risk for HIV or not.  Cancer screening tests  Cervical cancer screening: If you have a cervix, begin getting regular cervical cancer screening tests starting at age 21.  Breast cancer scan (mammogram): If you've ever had breasts, begin having regular mammograms starting at age 40. This is a scan to check for breast cancer.  Colon cancer screening: It is important to start screening for colon cancer at age 45.  Have a colonoscopy test every 10 years (or more often if you're at risk) Or, ask your provider about stool tests like a FIT test every year or Cologuard test every 3 years.  To learn more about your testing options, visit:   .  For help making a decision, visit:   https://bit.ly/jp58987.  Prostate cancer screening test: If you have a prostate, ask your care team if a prostate cancer screening test (PSA) at age 55 is right for you.  Lung cancer screening: If you are a current or former smoker ages 50 to 80, ask your care team if ongoing lung cancer screenings are right for you.  For informational purposes only. Not to replace the advice of your health care provider. Copyright   2023 [facility] [employer]. All rights reserved. Clinically reviewed by the [facility]itions [facility]. Wheel[employer] 612268 - REV 01/24.  Hearing Loss: Care Instructions  Overview     Hearing loss is a sudden or slow decrease in how well you hear. It can range from slight to profound. Permanent hearing loss can occur with aging. It also can  happen when you are exposed long-term to loud noise. Examples include listening to loud music, riding motorcycles, or being around other loud machines.  Hearing loss can affect your work and home life. It can make you feel lonely or depressed. You may feel that you have lost your independence. But hearing aids and other devices can help you hear better and feel connected to others.  Follow-up care is a key part of your treatment and safety. Be sure to make and go to all appointments, and call your doctor if you are having problems. It's also a good idea to know your test results and keep a list of the medicines you take.  How can you care for yourself at home?  Avoid loud noises whenever possible. This helps keep your hearing from getting worse.  Always wear hearing protection around loud noises.  Wear a hearing aid as directed.  A professional can help you pick a hearing aid that will work best for you.  You can also get hearing aids over the counter for mild to moderate hearing loss.  Have hearing tests as your doctor suggests. They can show whether your hearing has changed. Your hearing aid may need to be adjusted.  Use other devices as needed. These may include:  Telephone amplifiers and hearing aids that can connect to a television, stereo, radio, or microphone.  Devices that use lights or vibrations. These alert you to the doorbell, a ringing telephone, or a baby monitor.  Television closed-captioning. This shows the words at the bottom of the screen. Most new TVs can do this.  TTY (text telephone). This lets you type messages back and forth on the telephone instead of talking or listening. These devices are also called TDD. When messages are typed on the keyboard, they are sent over the phone line to a receiving TTY. The message is shown on a monitor.  Use text messaging, social media, and email if it is hard for you to communicate by telephone.  Try to learn a listening technique called speechreading. It is  "not lipreading. You pay attention to people's gestures, expressions, posture, and tone of voice. These clues can help you understand what a person is saying. Face the person you are talking to, and have them face you. Make sure the lighting is good. You need to see the other person's face clearly.  Think about counseling if you need help to adjust to your hearing loss.  When should you call for help?  Watch closely for changes in your health, and be sure to contact your doctor if:    You think your hearing is getting worse.     You have new symptoms, such as dizziness or nausea.   Where can you learn more?  Go to https://www.LOOKK.net/patiented  Enter R798 in the search box to learn more about \"Hearing Loss: Care Instructions.\"  Current as of: September 27, 2023               Content Version: 14.0    6816-9127 [employer].   Care instructions adapted under license by your healthcare professional. If you have questions about a medical condition or this instruction, always ask your healthcare professional. [employer], [employer] disclaims any warranty or liability for your use of this information.         "

## 2024-08-28 ENCOUNTER — LAB (OUTPATIENT)
Dept: LAB | Facility: CLINIC | Age: 64
End: 2024-08-28
Payer: COMMERCIAL

## 2024-08-28 DIAGNOSIS — Z12.5 SCREENING FOR PROSTATE CANCER: ICD-10-CM

## 2024-08-28 DIAGNOSIS — I26.99 ACUTE PULMONARY EMBOLISM WITHOUT ACUTE COR PULMONALE, UNSPECIFIED PULMONARY EMBOLISM TYPE (H): ICD-10-CM

## 2024-08-28 DIAGNOSIS — Z00.00 ENCOUNTER FOR MEDICARE ANNUAL WELLNESS EXAM: ICD-10-CM

## 2024-08-28 LAB
CHOLEST SERPL-MCNC: 139 MG/DL
ERYTHROCYTE [DISTWIDTH] IN BLOOD BY AUTOMATED COUNT: 11.2 % (ref 10–15)
FASTING STATUS PATIENT QL REPORTED: YES
HCT VFR BLD AUTO: 43.6 % (ref 40–53)
HDLC SERPL-MCNC: 41 MG/DL
HGB BLD-MCNC: 14.7 G/DL (ref 13.3–17.7)
LDLC SERPL CALC-MCNC: 79 MG/DL
MCH RBC QN AUTO: 30.5 PG (ref 26.5–33)
MCHC RBC AUTO-ENTMCNC: 33.7 G/DL (ref 31.5–36.5)
MCV RBC AUTO: 91 FL (ref 78–100)
NONHDLC SERPL-MCNC: 98 MG/DL
PLATELET # BLD AUTO: 177 10E3/UL (ref 150–450)
PSA SERPL DL<=0.01 NG/ML-MCNC: 1.54 NG/ML (ref 0–4.5)
RBC # BLD AUTO: 4.82 10E6/UL (ref 4.4–5.9)
TRIGL SERPL-MCNC: 94 MG/DL
WBC # BLD AUTO: 6.1 10E3/UL (ref 4–11)

## 2024-08-28 PROCEDURE — 80061 LIPID PANEL: CPT

## 2024-08-28 PROCEDURE — G0103 PSA SCREENING: HCPCS

## 2024-08-28 PROCEDURE — 85027 COMPLETE CBC AUTOMATED: CPT

## 2024-08-28 PROCEDURE — 36415 COLL VENOUS BLD VENIPUNCTURE: CPT

## 2024-11-26 ENCOUNTER — ANCILLARY PROCEDURE (OUTPATIENT)
Dept: GENERAL RADIOLOGY | Facility: CLINIC | Age: 64
End: 2024-11-26
Attending: PHYSICIAN ASSISTANT
Payer: COMMERCIAL

## 2024-11-26 ENCOUNTER — OFFICE VISIT (OUTPATIENT)
Dept: FAMILY MEDICINE | Facility: CLINIC | Age: 64
End: 2024-11-26
Payer: COMMERCIAL

## 2024-11-26 VITALS
OXYGEN SATURATION: 98 % | WEIGHT: 207.8 LBS | HEIGHT: 70 IN | HEART RATE: 57 BPM | RESPIRATION RATE: 19 BRPM | SYSTOLIC BLOOD PRESSURE: 116 MMHG | BODY MASS INDEX: 29.75 KG/M2 | DIASTOLIC BLOOD PRESSURE: 73 MMHG | TEMPERATURE: 98.1 F

## 2024-11-26 DIAGNOSIS — M25.562 ACUTE PAIN OF LEFT KNEE: Primary | ICD-10-CM

## 2024-11-26 DIAGNOSIS — M25.562 ACUTE PAIN OF LEFT KNEE: ICD-10-CM

## 2024-11-26 PROCEDURE — 73562 X-RAY EXAM OF KNEE 3: CPT | Mod: TC | Performed by: STUDENT IN AN ORGANIZED HEALTH CARE EDUCATION/TRAINING PROGRAM

## 2024-11-26 PROCEDURE — 99214 OFFICE O/P EST MOD 30 MIN: CPT | Performed by: PHYSICIAN ASSISTANT

## 2024-11-26 RX ORDER — DIPHENHYDRAMINE HCL 25 MG
25 TABLET ORAL EVERY 6 HOURS PRN
COMMUNITY

## 2024-11-26 RX ORDER — CETIRIZINE HYDROCHLORIDE 10 MG/1
10 TABLET ORAL DAILY
COMMUNITY

## 2024-11-26 ASSESSMENT — ENCOUNTER SYMPTOMS
COLOR CHANGE: 0
ARTHRALGIAS: 1
WEAKNESS: 0

## 2024-11-26 ASSESSMENT — PAIN SCALES - GENERAL: PAINLEVEL_OUTOF10: SEVERE PAIN (6)

## 2024-11-26 NOTE — PROGRESS NOTES
Assessment & Plan     Acute pain of left knee  Patient is a 64-year-old male who presents to clinic due to left knee pain located to medial/posterior aspect that started 1 week ago after doing significant squatting exercises with twisting.  Vital signs normal.  Physical exam significant for tenderness to palpation at medial/posterior joint line.  Low suspicion for soft tissue injury including meniscus pathology as no effusion occurred after injury and negative Lachman's as well as Desmond's.  X-ray completed, reviewed with patient, showing mild medial joint space narrowing, but no other acute bony abnormalities.  Discussed that symptoms will likely improve with rest, anti-inflammatories: NSAIDs and ice, and formal physical therapy.  If symptoms do not improve over the next month patient will reach out via [email or web address] and we will place order for MRI.  - XR Knee Left 3 Views; Future  - Physical Therapy  Referral; Future            See Patient Instructions    Subjective   Humble is a 64 year old, presenting for the following health issues:  Knee Pain        11/26/2024     8:43 AM   Additional Questions   Roomed by Jacqueline QUIJANO LPN   Accompanied by Self     History of Present Illness       Reason for visit:  Knee injury  Symptom onset:  3-7 days ago  Symptom intensity:  Moderate  Symptom progression:  Staying the same  Had these symptoms before:  No  What makes it worse:  Side movement  What makes it better:  Ibuprofen and cold packs   He is taking medications regularly.     Left knee is the knee he injured.  Edith Recinos PA-C on 11/26/2024 at 8:54 AM      Patient was working out last week at home doing planting/twisting/squat activities. The next day, left knee started hurting at medial posterior aspect with pushing on the area. All movements cause pain. Knee hurts all of the time. No swelling. Knee catches and gives out. Surgical HX: medial menisectomy-left     Review of Systems   Musculoskeletal:  Positive for  "arthralgias.   Skin:  Negative for color change.   Neurological:  Negative for weakness.           Objective    /73   Pulse 57   Temp 98.1  F (36.7  C) (Temporal)   Resp 19   Ht 1.778 m (5' 10\")   Wt 94.3 kg (207 lb 12.8 oz)   SpO2 98%   BMI 29.82 kg/m    Body mass index is 29.82 kg/m .  Physical Exam  Vitals and nursing note reviewed.   Constitutional:       General: He is not in acute distress.     Appearance: Normal appearance.   HENT:      Head: Normocephalic and atraumatic.   Eyes:      Extraocular Movements: Extraocular movements intact.      Pupils: Pupils are equal, round, and reactive to light.   Cardiovascular:      Rate and Rhythm: Normal rate.   Pulmonary:      Effort: Pulmonary effort is normal.   Musculoskeletal:         General: Normal range of motion.      Cervical back: Normal range of motion.      Comments: Left  knee:    Swelling: No  Ecchymosis: No  Gait: Normal   Medial tenderness: Medial joint line, posteriorly   Lateral tenderness: No  Anterior tenderness: No  Posterior tenderness: Medial joint line, posteriorly   Extension: 0  Flexion: 130  Patellar tracking / apprehension: No  Bayron's Patellar compression test: Negative  Mc Jack's: Negative   Lachman's / Anterior drawer: Negative   Valgus stress: Normal  Varus stress: Normal           Skin:     General: Skin is warm and dry.   Neurological:      General: No focal deficit present.      Mental Status: He is alert.   Psychiatric:         Mood and Affect: Mood normal.         Behavior: Behavior normal.            Xray - Reviewed and interpreted by me.  Left knee, 3V: Mild medial compartment narrowing.  No osteophyte formation.  No effusion.        Signed Electronically by: Edith Recinos PA-C    "

## 2024-11-26 NOTE — PATIENT INSTRUCTIONS
Your left knee x-rays are reassuring.  You have some very mild arthritis at the inside of your knee, but no other worrisome findings.     Next steps:  -Use ice and Tylenol/ibuprofen for pain management  -Advance activities as tolerated and decrease activities if pain increases  -Start formal physical therapy.  Scheduling will call you to set this up.    If your symptoms do not improve over the next 4 weeks, please send a [employer] message and we will proceed with an MRI.  Reach out with questions/concerns.

## 2024-12-23 ENCOUNTER — MYC MEDICAL ADVICE (OUTPATIENT)
Dept: FAMILY MEDICINE | Facility: CLINIC | Age: 64
End: 2024-12-23
Payer: COMMERCIAL

## 2024-12-23 DIAGNOSIS — M25.562 ACUTE PAIN OF LEFT KNEE: Primary | ICD-10-CM

## 2025-01-09 DIAGNOSIS — S83.8X2A ACUTE MEDIAL MENISCAL INJURY OF LEFT KNEE, INITIAL ENCOUNTER: Primary | ICD-10-CM

## 2025-01-16 ENCOUNTER — OFFICE VISIT (OUTPATIENT)
Dept: ORTHOPEDICS | Facility: CLINIC | Age: 65
End: 2025-01-16
Attending: PHYSICIAN ASSISTANT
Payer: COMMERCIAL

## 2025-01-16 VITALS
DIASTOLIC BLOOD PRESSURE: 73 MMHG | HEIGHT: 70 IN | BODY MASS INDEX: 29.63 KG/M2 | WEIGHT: 207 LBS | SYSTOLIC BLOOD PRESSURE: 131 MMHG | OXYGEN SATURATION: 98 % | HEART RATE: 69 BPM

## 2025-01-16 DIAGNOSIS — S83.8X2A ACUTE MEDIAL MENISCAL INJURY OF LEFT KNEE, INITIAL ENCOUNTER: ICD-10-CM

## 2025-01-16 RX ORDER — LIDOCAINE HYDROCHLORIDE 10 MG/ML
4 INJECTION, SOLUTION INFILTRATION; PERINEURAL
Status: COMPLETED | OUTPATIENT
Start: 2025-01-16 | End: 2025-01-16

## 2025-01-16 RX ORDER — METHYLPREDNISOLONE ACETATE 80 MG/ML
80 INJECTION, SUSPENSION INTRA-ARTICULAR; INTRALESIONAL; INTRAMUSCULAR; SOFT TISSUE
Status: COMPLETED | OUTPATIENT
Start: 2025-01-16 | End: 2025-01-16

## 2025-01-16 RX ADMIN — LIDOCAINE HYDROCHLORIDE 4 ML: 10 INJECTION, SOLUTION INFILTRATION; PERINEURAL at 14:07

## 2025-01-16 RX ADMIN — METHYLPREDNISOLONE ACETATE 80 MG: 80 INJECTION, SUSPENSION INTRA-ARTICULAR; INTRALESIONAL; INTRAMUSCULAR; SOFT TISSUE at 14:07

## 2025-01-16 ASSESSMENT — KOOS JR
HOW SEVERE IS YOUR KNEE STIFFNESS AFTER FIRST WAKING IN MORNING: MODERATE
TWISING OR PIVOTING ON KNEE: MODERATE
KOOS JR SCORING: 63.78
RISING FROM SITTING: MILD
BENDING TO THE FLOOR TO PICK UP OBJECT: MODERATE
STRAIGHTENING KNEE FULLY: MILD
GOING UP OR DOWN STAIRS: MILD

## 2025-01-16 ASSESSMENT — PAIN SCALES - GENERAL: PAINLEVEL_OUTOF10: MILD PAIN (3)

## 2025-01-16 NOTE — LETTER
1/16/2025      Cristopher Cuellar  [address] [facility] [id number]      Dear Colleague,    Thank you for referring your patient, Cristopher uCellar, to the [facility]. Please see a copy of my visit note below.    HISTORY OF PRESENT ILLNESS    Cristopher Cuellar is a 64 year old male seen at the request of Edith Recinos PA-C for evaluation of a left knee injury that occurred  approximately 2 months ago.    The mechanism was : working out at home doing planting/twisting/squat activities 6 weeks ago., and patient was doing squats felt a sharp pain. A pop was not heard.  The knee did not swell..       Present symptoms: pain to walk, sitting too long, like in car..  no catching, locking or giving-way.   Start up pain is the worst.  Had some night pain initially, not lately.     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  Usual level of work activity: retired. 10 days ago.     Orthopedic PMH: history of medial meniscus tear 20 years, with surgery left knee.     Other PMH: See patient history on [facility].    REVIEW OF SYSTEMS:    CONSTITUTIONAL:  NEGATIVE for fever, chills, change in weight  INTEGUMENTARY/SKIN:  NEGATIVE for worrisome rashes, moles or lesions  EYES:  NEGATIVE for vision changes or irritation  ENT/MOUTH:  NEGATIVE for ear, mouth and throat problems  RESP:  NEGATIVE for significant cough or SOB  BREAST:  NEGATIVE for masses, tenderness or discharge  CV:  NEGATIVE for chest pain, palpitations or peripheral edema  GI:  NEGATIVE for nausea, abdominal pain, heartburn, or change in bowel habits  :  Negative   MUSCULOSKELETAL:  See HPI above  NEURO:  NEGATIVE for weakness, dizziness or paresthesias  ENDOCRINE:  NEGATIVE for temperature intolerance, skin/hair changes  HEME/ALLERGY/IMMUNE:  NEGATIVE for bleeding problems  PSYCHIATRIC:  NEGATIVE for changes in mood or affect      PHYSICAL EXAM:    GENERAL APPEARANCE: healthy, alert, and  no distress   SKIN: no suspicious lesions or rashes  NEURO: Normal strength and tone, mentation intact, and speech normal  PSYCH:  mentation appears normal and affect normal/bright    KNEE EXAM:   Gait: walks with normal gait  Alignment: Normal  Squat: 80 % limited by pain medial and posterior.     mild crepitations in the patellofemoral joint.  Tender: medial joint line  ROM: full  Effusion: none    McMurrays: positive medially  Ligaments:    Lachman's Stable, Anterior and posterior drawer stable, stable to varus and valgus stress.  no pain with Varus/Valgus stress testing.    Patellofemoral joint:      Lateral retinaculum is not tight   Facet Tenderness: none   Apprehension: negative    XR KNEE LEFT 3 VIEWS 11/26/2024 9:14 AM      HISTORY: Left knee injury, pain at medial/posterior aspect. History of  menisectomy 20 years ago; Acute pain of left knee     COMPARISON: None.                                                                       IMPRESSION:  No fracture or malalignment. Joint spaces are maintained. Small left  knee effusion. Small distal quadriceps enthesophyte.    EXAM: MR KNEE LEFT W/O CONTRAST  LOCATION: [facility]  DATE: 1/9/2025     INDICATION: Left knee pain.   COMPARISON: Radiographs 11/26/2024.   TECHNIQUE: Unenhanced.                                                                      IMPRESSION:  1.  Horizontal tear of the posterior horn and body of the medial meniscus.  2.  Mild degenerative arthrosis of the medial and patellofemoral compartments.  3.  Intact lateral meniscus, and cruciate and collateral ligaments.  4.  Small Baker's cyst.     Impression: medial meniscus tear, horizontal without a definite unstable fragment.  Mild left knee osteoarthritis     Plan: I suggested a corticosteroid injection.  If this is not successful, then I would consider knee arthroscopy.  I explained the pathoanatomy in the area, and possible treatment options.  Continue physical  therapy and home exercise program   He has a trip coming up in April and would like to do a lot of hiking.  With the patient's consent, left knee(s) injected intra-articularly with 80mg of Depomedrol and 4cc of local anesthetic after sterile prep.     Return to clinic as needed       NIKITA Angel MD  Dept. Orthopedic Surgery  [facility]    Large Joint Injection/Arthocentesis: L knee joint    Date/Time: 1/16/2025 2:07 PM    Performed by: Zafar Angel MD  Authorized by: Zafar Angel MD    Indications:  Pain  Needle Size:  22 G  Guidance: landmark guided    Approach:  Lateral  Location:  Knee      Medications:  80 mg methylPREDNISolone 80 MG/ML; 4 mL lidocaine 1 %  Outcome:  Tolerated well, no immediate complications  Procedure discussed: discussed risks, benefits, and alternatives    Consent Given by:  Patient  Timeout: timeout called immediately prior to procedure    Prep: patient was prepped and draped in usual sterile fashion          Again, thank you for allowing me to participate in the care of your patient.        Sincerely,        Zafar Angel MD    Electronically signed

## 2025-01-16 NOTE — PROGRESS NOTES
Large Joint Injection/Arthocentesis: L knee joint    Date/Time: 1/16/2025 2:07 PM    Performed by: Zafar Angel MD  Authorized by: Zafar Angel MD    Indications:  Pain  Needle Size:  22 G  Guidance: landmark guided    Approach:  Lateral  Location:  Knee      Medications:  80 mg methylPREDNISolone 80 MG/ML; 4 mL lidocaine 1 %  Outcome:  Tolerated well, no immediate complications  Procedure discussed: discussed risks, benefits, and alternatives    Consent Given by:  Patient  Timeout: timeout called immediately prior to procedure    Prep: patient was prepped and draped in usual sterile fashion

## 2025-01-16 NOTE — PROGRESS NOTES
HISTORY OF PRESENT ILLNESS    Cristopher Cuellar is a 64 year old male seen at the request of Edith Recinos PA-C for evaluation of a left knee injury that occurred  approximately 2 months ago.    The mechanism was : working out at home doing planting/twisting/squat activities 6 weeks ago., and patient was doing squats felt a sharp pain. A pop was not heard.  The knee did not swell..       Present symptoms: pain to walk, sitting too long, like in car..  no catching, locking or giving-way.   Start up pain is the worst.  Had some night pain initially, not lately.     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  Usual level of work activity: retired. 10 days ago.     Orthopedic PMH: history of medial meniscus tear 20 years, with surgery left knee.     Other PMH: See patient history on [facility].    REVIEW OF SYSTEMS:    CONSTITUTIONAL:  NEGATIVE for fever, chills, change in weight  INTEGUMENTARY/SKIN:  NEGATIVE for worrisome rashes, moles or lesions  EYES:  NEGATIVE for vision changes or irritation  ENT/MOUTH:  NEGATIVE for ear, mouth and throat problems  RESP:  NEGATIVE for significant cough or SOB  BREAST:  NEGATIVE for masses, tenderness or discharge  CV:  NEGATIVE for chest pain, palpitations or peripheral edema  GI:  NEGATIVE for nausea, abdominal pain, heartburn, or change in bowel habits  :  Negative   MUSCULOSKELETAL:  See HPI above  NEURO:  NEGATIVE for weakness, dizziness or paresthesias  ENDOCRINE:  NEGATIVE for temperature intolerance, skin/hair changes  HEME/ALLERGY/IMMUNE:  NEGATIVE for bleeding problems  PSYCHIATRIC:  NEGATIVE for changes in mood or affect      PHYSICAL EXAM:    GENERAL APPEARANCE: healthy, alert, and no distress   SKIN: no suspicious lesions or rashes  NEURO: Normal strength and tone, mentation intact, and speech normal  PSYCH:  mentation appears normal and affect normal/bright    KNEE EXAM:   Gait: walks with normal  gait  Alignment: Normal  Squat: 80 % limited by pain medial and posterior.     mild crepitations in the patellofemoral joint.  Tender: medial joint line  ROM: full  Effusion: none    McMurrays: positive medially  Ligaments:    Lachman's Stable, Anterior and posterior drawer stable, stable to varus and valgus stress.  no pain with Varus/Valgus stress testing.    Patellofemoral joint:      Lateral retinaculum is not tight   Facet Tenderness: none   Apprehension: negative    XR KNEE LEFT 3 VIEWS 11/26/2024 9:14 AM      HISTORY: Left knee injury, pain at medial/posterior aspect. History of  menisectomy 20 years ago; Acute pain of left knee     COMPARISON: None.                                                                       IMPRESSION:  No fracture or malalignment. Joint spaces are maintained. Small left  knee effusion. Small distal quadriceps enthesophyte.    EXAM: MR KNEE LEFT W/O CONTRAST  LOCATION: [facility]  DATE: 1/9/2025     INDICATION: Left knee pain.   COMPARISON: Radiographs 11/26/2024.   TECHNIQUE: Unenhanced.                                                                      IMPRESSION:  1.  Horizontal tear of the posterior horn and body of the medial meniscus.  2.  Mild degenerative arthrosis of the medial and patellofemoral compartments.  3.  Intact lateral meniscus, and cruciate and collateral ligaments.  4.  Small Baker's cyst.     Impression: medial meniscus tear, horizontal without a definite unstable fragment.  Mild left knee osteoarthritis     Plan: I suggested a corticosteroid injection.  If this is not successful, then I would consider knee arthroscopy.  I explained the pathoanatomy in the area, and possible treatment options.  Continue physical therapy and home exercise program   He has a trip coming up in April and would like to do a lot of hiking.  With the patient's consent, left knee(s) injected intra-articularly with 80mg of Depomedrol and 4cc of local  anesthetic after sterile prep.     Return to clinic as needed       NIKITA Angel MD  Dept. Orthopedic Surgery  [facility]

## 2025-02-13 ENCOUNTER — TELEPHONE (OUTPATIENT)
Dept: FAMILY MEDICINE | Facility: CLINIC | Age: 65
End: 2025-02-13
Payer: COMMERCIAL

## 2025-02-13 DIAGNOSIS — N52.03 COMBINED ARTERIAL INSUFFICIENCY AND CORPORO-VENOUS OCCLUSIVE ERECTILE DYSFUNCTION: ICD-10-CM

## 2025-02-13 RX ORDER — SILDENAFIL 100 MG/1
TABLET, FILM COATED ORAL
Qty: 30 TABLET | Refills: 2 | Status: SHIPPED | OUTPATIENT
Start: 2025-02-13

## 2025-02-13 NOTE — TELEPHONE ENCOUNTER
Reason for Call:  Medication or medication refill:    Do you use a [facility]?  Name of the pharmacy and phone number for the current request:  [facility] [place] - [phone]    Name of the medication requested: sildenafil (VIAGRA) 100 MG tablet     Other request:     Can we leave a detailed message on this number? YES    Phone number patient can be reached at: Home number on file [phone] (home)    Best Time: any    Call taken on 2/13/2025 at 9:11 AM by Jazmyn Ding

## 2025-04-01 NOTE — PROGRESS NOTES
HISTORY OF PRESENT ILLNESS    Cristopher Cuellar is a 64 year old male seen for follow up of left  knee osteoarthritis, knee injury 6 months ago.  medial meniscus tear, horizontal without a definite unstable fragment.  Mild left knee osteoarthritis     History of left knee arthroscopy 20 years ago.     Injection(s) last time Left knee steroid on 1/16/25.  Length of effectiveness: 3 months starting to come back.  He has a big hiking trip coming up         Has done physical therapy   No  yet.    Plan last time:  I suggested a corticosteroid injection.  If this is not successful, then I would consider knee arthroscopy.  I explained the pathoanatomy in the area, and possible treatment options.  Continue physical therapy and home exercise program   He has a trip coming up in April and would like to do a lot of hiking.  With the patient's consent, left knee(s) injected intra-articularly with 80mg of Depomedrol and 4cc of local anesthetic after sterile prep.       Orthopedic PMH: history of medial meniscus tear 20 years, with surgery left knee.       Present symptoms: pain starting to come back:   pain to walk, sitting too long, like in car..  no catching, locking or giving-way.   Start up pain is the worst.  Had some night pain initially, not lately.      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  Usual level of work activity: retired. 10 days ago.       EXAM: MR KNEE LEFT W/O CONTRAST  LOCATION: [facility]  DATE: 1/9/2025    1.  Horizontal tear of the posterior horn and body of the medial meniscus.  2.  Mild degenerative arthrosis of the medial and patellofemoral compartments.  3.  Intact lateral meniscus, and cruciate and collateral ligaments.  4.  Small Baker's cyst.       Impression:     ICD-10-CM    1. Acute medial meniscal injury of left knee, initial encounter  S83.8X2A       2. Primary osteoarthritis of left knee  M17.12             Plan:    He wanted a repeat injection today for his trip  He will remain active of course  We discussed arthroscopy if corticosteroid injection not doing great. Discussed that arthroscopy has better outcomes in those with arthritis that have mechanical symptoms, but his knee osteoarthritis doesn't appear to be too bad so pain without mechanical symptoms could still be an indication for surgery if other methods of treatment fail.     Return to clinic prn    With the patient's consent, left knee(s) injected intra-articularly with 80mg of Depomedrol and 4cc of local anesthetic after sterile prep.     NIKITA Angel MD  Dept. Orthopedic Surgery  [facility]

## 2025-04-02 ASSESSMENT — KOOS JR
TWISING OR PIVOTING ON KNEE: MODERATE
BENDING TO THE FLOOR TO PICK UP OBJECT: MILD
RISING FROM SITTING: MILD
KOOS JR SCORING: 70.7
HOW SEVERE IS YOUR KNEE STIFFNESS AFTER FIRST WAKING IN MORNING: MILD
GOING UP OR DOWN STAIRS: MILD

## 2025-04-03 ENCOUNTER — OFFICE VISIT (OUTPATIENT)
Dept: ORTHOPEDICS | Facility: CLINIC | Age: 65
End: 2025-04-03
Payer: COMMERCIAL

## 2025-04-03 VITALS — SYSTOLIC BLOOD PRESSURE: 132 MMHG | DIASTOLIC BLOOD PRESSURE: 86 MMHG | OXYGEN SATURATION: 96 % | HEART RATE: 65 BPM

## 2025-04-03 DIAGNOSIS — S83.8X2A ACUTE MEDIAL MENISCAL INJURY OF LEFT KNEE, INITIAL ENCOUNTER: Primary | ICD-10-CM

## 2025-04-03 DIAGNOSIS — M17.12 PRIMARY OSTEOARTHRITIS OF LEFT KNEE: ICD-10-CM

## 2025-04-03 RX ORDER — LIDOCAINE HYDROCHLORIDE 10 MG/ML
4 INJECTION, SOLUTION EPIDURAL; INFILTRATION; INTRACAUDAL; PERINEURAL
Status: COMPLETED | OUTPATIENT
Start: 2025-04-03 | End: 2025-04-03

## 2025-04-03 RX ORDER — METHYLPREDNISOLONE ACETATE 80 MG/ML
80 INJECTION, SUSPENSION INTRA-ARTICULAR; INTRALESIONAL; INTRAMUSCULAR; SOFT TISSUE
Status: COMPLETED | OUTPATIENT
Start: 2025-04-03 | End: 2025-04-03

## 2025-04-03 RX ADMIN — LIDOCAINE HYDROCHLORIDE 4 ML: 10 INJECTION, SOLUTION EPIDURAL; INFILTRATION; INTRACAUDAL; PERINEURAL at 12:53

## 2025-04-03 RX ADMIN — METHYLPREDNISOLONE ACETATE 80 MG: 80 INJECTION, SUSPENSION INTRA-ARTICULAR; INTRALESIONAL; INTRAMUSCULAR; SOFT TISSUE at 12:53

## 2025-04-03 ASSESSMENT — PAIN SCALES - GENERAL: PAINLEVEL_OUTOF10: MILD PAIN (2)

## 2025-04-03 NOTE — PROGRESS NOTES
Large Joint Injection/Arthocentesis: L knee joint    Date/Time: 4/3/2025 12:53 PM    Performed by: Greg Buckley  Authorized by: Zafar Angel MD    Indications:  Pain and osteoarthritis  Needle Size:  22 G  Guidance: landmark guided    Approach:  Anterolateral  Location:  Knee      Medications:  80 mg methylPREDNISolone 80 MG/ML; 4 mL lidocaine (PF) 1 %  Outcome:  Tolerated well, no immediate complications  Procedure discussed: discussed risks, benefits, and alternatives    Consent Given by:  Patient  Timeout: timeout called immediately prior to procedure    Prep: patient was prepped and draped in usual sterile fashion

## 2025-04-03 NOTE — LETTER
4/3/2025      rCistopher Cuellar  [address] [facility] [address]      Dear Colleague,    Thank you [facility]ellar[facility] Please see a copy of my visit note below.    HISTORY OF PRESENT ILLNESS    Cristopher Cuellar is a 64 year old male seen for follow up of left  knee osteoarthritis, knee injury 6 months ago.  medial meniscus tear, horizontal without a definite unstable fragment.  Mild left knee osteoarthritis     History of left knee arthroscopy 20 years ago.     Injection(s) last time Left knee steroid on 1/16/25.  Length of effectiveness: 3 months starting to come back.  He has a big hiking trip coming up         Has done physical therapy   No  yet.    Plan last time:  I suggested a corticosteroid injection.  If this is not successful, then I would consider knee arthroscopy.  I explained the pathoanatomy in the area, and possible treatment options.  Continue physical therapy and home exercise program   He has a trip coming up in April and would like to do a lot of hiking.  With the patient's consent, left knee(s) injected intra-articularly with 80mg of Depomedrol and 4cc of local anesthetic after sterile prep.       Orthopedic PMH: history of medial meniscus tear 20 years, with surgery left knee.       Present symptoms: pain starting to come back:   pain to walk, sitting too long, like in car..  no catching, locking or giving-way.   Start up pain is the worst.  Had some night pain initially, not lately.      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  Usual level of work activity: retired. 10 days ago.       EXAM: MR KNEE LEFT W/O CONTRAST  LOCATION: [facility]  DATE: 1/9/2025    1.  Horizontal tear of the posterior horn and body of the medial meniscus.  2.  Mild degenerative arthrosis of the medial and patellofemoral compartments.  3.  Intact lateral  meniscus, and cruciate and collateral ligaments.  4.  Small Baker's cyst.       Impression:     ICD-10-CM    1. Acute medial meniscal injury of left knee, initial encounter  S83.8X2A       2. Primary osteoarthritis of left knee  M17.12            Plan:    He wanted a repeat injection today for his trip  He will remain active of course  We discussed arthroscopy if corticosteroid injection not doing great. Discussed that arthroscopy has better outcomes in those with arthritis that have mechanical symptoms, but his knee osteoarthritis doesn't appear to be too bad so pain without mechanical symptoms could still be an indication for surgery if other methods of treatment fail.     Return to clinic prn    With the patient's consent, left knee(s) injected intra-articularly with 80mg of Depomedrol and 4cc of local anesthetic after sterile prep.     NIKITA Angel MD  Dept. Orthopedic Surgery  [facility]    Large Joint Injection/Arthocentesis: L knee joint    Date/Time: 4/3/2025 12:53 PM    Performed by: Greg Buckley  Authorized by: Zafar Angel MD    Indications:  Pain and osteoarthritis  Needle Size:  22 G  Guidance: landmark guided    Approach:  Anterolateral  Location:  Knee      Medications:  80 mg methylPREDNISolone 80 MG/ML; 4 mL lidocaine (PF) 1 %  Outcome:  Tolerated well, no immediate complications  Procedure discussed: discussed risks, benefits, and alternatives    Consent Given by:  Patient  Timeout: timeout called immediately prior to procedure    Prep: patient was prepped and draped in usual sterile fashion          Again, thank you for allowing me to participate in the care of your patient.        Sincerely,        Zafar Angel MD    Electronically signed

## 2025-04-03 NOTE — PATIENT INSTRUCTIONS
AFTER VISIT SUMMARY    [facility] CORTISONE Injection Discharge Instructions    You may shower, however avoid swimming, tub baths or hot tubs for 24 hours following your procedure    You may have a mild to moderate increase in pain for several days following the injection.    It may take up to 14 days for the steroid medication to start working although you may feel the effect as early as a few days after the procedure.    You may use ice packs for 10-15 minutes, 3 to 4 times a day at the injection site for comfort    You may use anti-inflammatory medications (such as Ibuprofen or Aleve or Advil) or Tylenol for pain control if necessary    If you were fasting, you may resume your normal diet and medications after the procedure    If you have diabetes, check your blood sugar more frequently than usual as your blood sugar may be higher than normal for 10-14 days following a steroid injection. Contact your doctor who manages your diabetes if your blood sugar is higher than usual      If you experience any of the following, call [facility] [phone]  -Fever over 100 degree F  -Swelling, bleeding, redness, drainage, warmth at the injection site  - New or worsening pain

## 2025-08-26 ENCOUNTER — TELEPHONE (OUTPATIENT)
Dept: FAMILY MEDICINE | Facility: CLINIC | Age: 65
End: 2025-08-26
Payer: COMMERCIAL

## (undated) DEVICE — ENDO SNARE EXACTO COLD 9MM LOOP 2.4MMX230CM 00711115